# Patient Record
Sex: FEMALE | Race: WHITE | NOT HISPANIC OR LATINO | Employment: FULL TIME | URBAN - METROPOLITAN AREA
[De-identification: names, ages, dates, MRNs, and addresses within clinical notes are randomized per-mention and may not be internally consistent; named-entity substitution may affect disease eponyms.]

---

## 2023-03-06 ENCOUNTER — TELEPHONE (OUTPATIENT)
Dept: PRIMARY CARE | Facility: CLINIC | Age: 48
End: 2023-03-06

## 2023-03-15 ENCOUNTER — OFFICE VISIT (OUTPATIENT)
Dept: PRIMARY CARE | Facility: CLINIC | Age: 48
End: 2023-03-15
Payer: COMMERCIAL

## 2023-03-15 VITALS
TEMPERATURE: 96.9 F | HEIGHT: 66 IN | HEART RATE: 83 BPM | OXYGEN SATURATION: 96 % | WEIGHT: 293 LBS | BODY MASS INDEX: 47.09 KG/M2 | DIASTOLIC BLOOD PRESSURE: 86 MMHG | SYSTOLIC BLOOD PRESSURE: 136 MMHG

## 2023-03-15 DIAGNOSIS — J18.9 PNEUMONIA OF RIGHT LOWER LOBE DUE TO INFECTIOUS ORGANISM: Primary | ICD-10-CM

## 2023-03-15 PROCEDURE — 99213 OFFICE O/P EST LOW 20 MIN: CPT | Performed by: INTERNAL MEDICINE

## 2023-03-15 RX ORDER — ROPINIROLE 2 MG/1
1 TABLET, FILM COATED ORAL NIGHTLY
COMMUNITY
Start: 2020-05-21 | End: 2023-07-27 | Stop reason: SDUPTHER

## 2023-03-15 RX ORDER — IPRATROPIUM BROMIDE AND ALBUTEROL SULFATE 2.5; .5 MG/3ML; MG/3ML
SOLUTION RESPIRATORY (INHALATION)
COMMUNITY
Start: 2023-03-06

## 2023-03-15 RX ORDER — BUDESONIDE AND FORMOTEROL FUMARATE DIHYDRATE 160; 4.5 UG/1; UG/1
2 AEROSOL RESPIRATORY (INHALATION) 2 TIMES DAILY
COMMUNITY
Start: 2023-03-01

## 2023-03-15 RX ORDER — FLUTICASONE PROPIONATE 50 MCG
2 SPRAY, SUSPENSION (ML) NASAL DAILY
COMMUNITY
Start: 2019-02-15 | End: 2023-08-01 | Stop reason: SDUPTHER

## 2023-03-15 RX ORDER — MEDROXYPROGESTERONE ACETATE 10 MG/1
TABLET ORAL
COMMUNITY
Start: 2022-11-17 | End: 2023-12-20 | Stop reason: SDUPTHER

## 2023-03-15 RX ORDER — MOMETASONE FUROATE 1 MG/G
CREAM TOPICAL
COMMUNITY
Start: 2021-05-20

## 2023-03-15 RX ORDER — CLOBETASOL PROPIONATE 0.5 MG/G
OINTMENT TOPICAL
COMMUNITY
Start: 2021-11-17

## 2023-03-15 RX ORDER — MONTELUKAST SODIUM 10 MG/1
1 TABLET ORAL DAILY
COMMUNITY
Start: 2019-05-16 | End: 2023-08-01 | Stop reason: SDUPTHER

## 2023-03-15 RX ORDER — IBUPROFEN 600 MG/1
1 TABLET ORAL 3 TIMES DAILY PRN
COMMUNITY
Start: 2020-02-21

## 2023-03-15 RX ORDER — MAGNESIUM 200 MG
TABLET ORAL
COMMUNITY
Start: 2019-11-20

## 2023-03-15 RX ORDER — ALBUTEROL SULFATE 90 UG/1
AEROSOL, METERED RESPIRATORY (INHALATION)
COMMUNITY
Start: 2023-03-03 | End: 2023-08-01 | Stop reason: SDUPTHER

## 2023-03-15 RX ORDER — FLUOCINOLONE ACETONIDE 0.11 MG/ML
OIL AURICULAR (OTIC)
COMMUNITY
Start: 2021-05-20 | End: 2024-01-31 | Stop reason: ALTCHOICE

## 2023-03-15 RX ORDER — AZELASTINE 1 MG/ML
SPRAY, METERED NASAL 2 TIMES DAILY
COMMUNITY
Start: 2021-06-21

## 2023-03-15 RX ORDER — MULTIVIT WITH MINERALS/HERBS
1 TABLET ORAL DAILY
COMMUNITY
End: 2023-08-01 | Stop reason: ALTCHOICE

## 2023-03-15 RX ORDER — CHOLECALCIFEROL (VITAMIN D3) 1250 MCG
TABLET ORAL
COMMUNITY
Start: 2019-11-20

## 2023-03-15 ASSESSMENT — ENCOUNTER SYMPTOMS
ALLERGIC/IMMUNOLOGIC NEGATIVE: 1
CONSTITUTIONAL NEGATIVE: 1
HEMATOLOGIC/LYMPHATIC NEGATIVE: 1
NEUROLOGICAL NEGATIVE: 1
CARDIOVASCULAR NEGATIVE: 1
PSYCHIATRIC NEGATIVE: 1
GASTROINTESTINAL NEGATIVE: 1
EYES NEGATIVE: 1
MUSCULOSKELETAL NEGATIVE: 1
COUGH: 1
ENDOCRINE NEGATIVE: 1

## 2023-03-15 NOTE — PROGRESS NOTES
"Subjective   Patient ID: Carolina Murrell is a 47 y.o. female who presents for pneumonia follow up .  Patient seen by me on 3/1 with bronchitis.  She was getting worse on therapy and was very short of breath a few days later so was given phone instructions to go to ER.  She states she went to ER on 3/6, but they were so busy, she went to an Urgent Care.  She states a CXR was done and she was told that she had pneumonia due to a \"spot\" in the right lower lung.  She was treated with albuterol and advised to continue Symbicort as well as a 7 day course of levofloxacin.  She states on day 6 of this, she finally started feeling better and is doing well now with no dyspnea, but still mild residual cough.  No fever or chills.  It is noted that she failed the therapy of doxycycline.        Review of Systems   Constitutional: Negative.    HENT: Negative.     Eyes: Negative.    Respiratory:  Positive for cough.    Cardiovascular: Negative.    Gastrointestinal: Negative.    Endocrine: Negative.    Genitourinary: Negative.    Musculoskeletal: Negative.    Skin: Negative.    Allergic/Immunologic: Negative.    Neurological: Negative.    Hematological: Negative.    Psychiatric/Behavioral: Negative.         Objective     Blood pressure 136/86, pulse 83, temperature 36.1 °C (96.9 °F), temperature source Temporal, height 1.676 m (5' 6\"), weight (!) 182 kg (400 lb 12.8 oz), SpO2 96 %.   Physical Exam  Constitutional:       Appearance: Normal appearance.   Neck:      Vascular: No carotid bruit.   Cardiovascular:      Rate and Rhythm: Normal rate and regular rhythm.      Pulses: Normal pulses.      Heart sounds: Normal heart sounds. No murmur heard.  Pulmonary:      Effort: Pulmonary effort is normal.      Breath sounds: Normal breath sounds. No wheezing or rales.   Abdominal:      General: Abdomen is flat. There is no distension.      Palpations: Abdomen is soft.      Tenderness: There is no abdominal tenderness.   Musculoskeletal:    "      General: Normal range of motion.      Cervical back: Normal range of motion and neck supple.   Skin:     General: Skin is warm and dry.   Neurological:      General: No focal deficit present.      Mental Status: She is alert and oriented to person, place, and time.   Psychiatric:         Mood and Affect: Mood normal.         Behavior: Behavior normal.         Assessment/Plan   Problem List Items Addressed This Visit    None  Visit Diagnoses       Pneumonia of right lower lobe due to infectious organism    -  Primary        She has clinically improved nicely.  Exam is negative.  Will recheck CXR 4 weeks after the first one.    Follow up as scheduled

## 2023-07-27 DIAGNOSIS — G25.81 RESTLESS LEG SYNDROME: Primary | ICD-10-CM

## 2023-07-27 RX ORDER — ROPINIROLE 2 MG/1
2 TABLET, FILM COATED ORAL NIGHTLY
Qty: 30 TABLET | Refills: 0 | Status: SHIPPED | OUTPATIENT
Start: 2023-07-27 | End: 2023-08-01 | Stop reason: SDUPTHER

## 2023-08-01 ENCOUNTER — OFFICE VISIT (OUTPATIENT)
Dept: PRIMARY CARE | Facility: CLINIC | Age: 48
End: 2023-08-01
Payer: COMMERCIAL

## 2023-08-01 VITALS
OXYGEN SATURATION: 98 % | DIASTOLIC BLOOD PRESSURE: 80 MMHG | SYSTOLIC BLOOD PRESSURE: 126 MMHG | HEIGHT: 66 IN | HEART RATE: 79 BPM | BODY MASS INDEX: 47.09 KG/M2 | TEMPERATURE: 97 F | WEIGHT: 293 LBS

## 2023-08-01 DIAGNOSIS — J30.1 ALLERGIC RHINITIS DUE TO POLLEN, UNSPECIFIED SEASONALITY: Primary | ICD-10-CM

## 2023-08-01 DIAGNOSIS — E66.01 CLASS 3 SEVERE OBESITY DUE TO EXCESS CALORIES WITH SERIOUS COMORBIDITY AND BODY MASS INDEX (BMI) OF 60.0 TO 69.9 IN ADULT (MULTI): ICD-10-CM

## 2023-08-01 DIAGNOSIS — G25.81 RESTLESS LEG SYNDROME: ICD-10-CM

## 2023-08-01 DIAGNOSIS — E78.1 HYPERTRIGLYCERIDEMIA: ICD-10-CM

## 2023-08-01 PROBLEM — I10 ESSENTIAL HYPERTENSION: Status: ACTIVE | Noted: 2023-08-01

## 2023-08-01 PROBLEM — J30.9 ALLERGIC RHINITIS: Status: ACTIVE | Noted: 2023-08-01

## 2023-08-01 PROBLEM — E78.00 HYPERCHOLESTEROLEMIA: Status: ACTIVE | Noted: 2023-08-01

## 2023-08-01 PROCEDURE — 1036F TOBACCO NON-USER: CPT | Performed by: INTERNAL MEDICINE

## 2023-08-01 PROCEDURE — 99214 OFFICE O/P EST MOD 30 MIN: CPT | Performed by: INTERNAL MEDICINE

## 2023-08-01 PROCEDURE — 3079F DIAST BP 80-89 MM HG: CPT | Performed by: INTERNAL MEDICINE

## 2023-08-01 PROCEDURE — 3008F BODY MASS INDEX DOCD: CPT | Performed by: INTERNAL MEDICINE

## 2023-08-01 PROCEDURE — 3074F SYST BP LT 130 MM HG: CPT | Performed by: INTERNAL MEDICINE

## 2023-08-01 RX ORDER — GABAPENTIN 100 MG/1
100 CAPSULE ORAL NIGHTLY
Qty: 90 CAPSULE | Refills: 1 | Status: SHIPPED | OUTPATIENT
Start: 2023-08-01 | End: 2024-01-31 | Stop reason: SDUPTHER

## 2023-08-01 RX ORDER — ROPINIROLE 2 MG/1
2 TABLET, FILM COATED ORAL NIGHTLY
Qty: 90 TABLET | Refills: 1 | Status: SHIPPED | OUTPATIENT
Start: 2023-08-01 | End: 2023-08-01 | Stop reason: SDUPTHER

## 2023-08-01 RX ORDER — MONTELUKAST SODIUM 10 MG/1
10 TABLET ORAL DAILY
Qty: 90 TABLET | Refills: 1 | Status: SHIPPED | OUTPATIENT
Start: 2023-08-01 | End: 2023-08-01 | Stop reason: SDUPTHER

## 2023-08-01 RX ORDER — ALBUTEROL SULFATE 90 UG/1
AEROSOL, METERED RESPIRATORY (INHALATION)
Qty: 54 G | Refills: 1 | Status: SHIPPED | OUTPATIENT
Start: 2023-08-01 | End: 2023-08-01 | Stop reason: SDUPTHER

## 2023-08-01 RX ORDER — FLUTICASONE PROPIONATE 50 MCG
2 SPRAY, SUSPENSION (ML) NASAL DAILY
Qty: 48 G | Refills: 1 | Status: SHIPPED | OUTPATIENT
Start: 2023-08-01 | End: 2023-08-01 | Stop reason: SDUPTHER

## 2023-08-01 ASSESSMENT — ENCOUNTER SYMPTOMS
ALLERGIC/IMMUNOLOGIC NEGATIVE: 1
CARDIOVASCULAR NEGATIVE: 1
MUSCULOSKELETAL NEGATIVE: 1
HEMATOLOGIC/LYMPHATIC NEGATIVE: 1
EYES NEGATIVE: 1
CONSTITUTIONAL NEGATIVE: 1
RESPIRATORY NEGATIVE: 1
NEUROLOGICAL NEGATIVE: 1
GASTROINTESTINAL NEGATIVE: 1
PSYCHIATRIC NEGATIVE: 1
ENDOCRINE NEGATIVE: 1

## 2023-08-01 NOTE — PROGRESS NOTES
"              After Visit Summary   2017    Richard Ball    MRN: [id number]           Patient Information     Date Of Birth          1963        Visit Information        Provider Department      2017 1:20 PM Teresa Esquivel MD [facility]        Today's Diagnoses     Cough    -  1     Acute non-recurrent frontal sinusitis            Follow-ups after your visit        Who to contact     If you have questions or need follow up information about today's clinic visit or your schedule please contact [facility] directly at [phone].  Normal or non-critical lab and imaging results will be communicated to you by [facility], letter or phone within 4 business days after the clinic has received the results. If you do not hear from us within 7 days, please contact the clinic through [facility] or phone. If you have a critical or abnormal lab result, we will notify you by phone as soon as possible.  Submit refill requests through shenzhoufu or call your pharmacy and they will forward the refill request to us. Please allow 3 business days for your refill to be completed.          Additional Information About Your Visit        MyChart Information     [email or web address] lets you send messages to your doctor, view your test results, renew your prescriptions, schedule appointments and more. To sign up, go to [email or web address] . Click on \"Log in\" on the left side of the screen, which will take you to the Welcome page. Then click on \"Sign up Now\" on the right side of the page.     You will be asked to enter the access code listed below, as well as some personal information. Please follow the directions to create your username and password.     Your access code is: [email or web address]  Expires: 3/8/2017  5:25 PM     Your access code will  in 90 days. If you need help or a new code, please call your [facility] or [phone].        Care EveryWhere ID     This is your Care EveryWhere ID. This " "Subjective   Patient ID: Carolina Murrell is a 47 y.o. female who presents for 6 month follow up .  Patient presents today in follow up re:   allergic rhinitis and restless legs.    Rhinitis Sx have been overall stable on current medical therapy.  No adverse effects of therapy.    Restless legs have not been as well controlled with therapy of late.  She has tried taking a second ropinirole with modestly good effect.  No adverse effects of therapy reported.        Review of Systems   Constitutional: Negative.    HENT: Negative.     Eyes: Negative.    Respiratory: Negative.     Cardiovascular: Negative.    Gastrointestinal: Negative.    Endocrine: Negative.    Genitourinary: Negative.    Musculoskeletal: Negative.    Skin: Negative.    Allergic/Immunologic: Negative.    Neurological: Negative.    Hematological: Negative.    Psychiatric/Behavioral: Negative.         Objective     Blood pressure 126/80, pulse 79, temperature 36.1 °C (97 °F), temperature source Temporal, height 1.676 m (5' 6\"), weight (!) 187 kg (411 lb 12.8 oz), SpO2 98 %.   Physical Exam  Vitals reviewed.   Constitutional:       Appearance: Normal appearance. She is obese.   Neck:      Vascular: No carotid bruit.   Cardiovascular:      Rate and Rhythm: Normal rate and regular rhythm.      Pulses: Normal pulses.      Heart sounds: Normal heart sounds. No murmur heard.  Pulmonary:      Effort: Pulmonary effort is normal.      Breath sounds: Normal breath sounds. No wheezing or rales.   Abdominal:      General: Abdomen is flat. There is no distension.      Palpations: Abdomen is soft.      Tenderness: There is no abdominal tenderness.   Musculoskeletal:         General: Normal range of motion.      Cervical back: Normal range of motion and neck supple.   Skin:     General: Skin is warm and dry.   Neurological:      General: No focal deficit present.      Mental Status: She is alert and oriented to person, place, and time.   Psychiatric:         Mood and " "could be used by other organizations to access your [place] medical records  [id number]        Your Vitals Were     Pulse Temperature Respirations Height BMI (Body Mass Index) Pulse Oximetry    70 97.6  F (36.4  C) (Tympanic) 20 5' 11\" (1.803 m) 23.86 kg/m2 97%       Blood Pressure from Last 3 Encounters:   01/04/17 122/60   12/08/16 116/52   04/15/15 120/60    Weight from Last 3 Encounters:   01/04/17 171 lb (77.565 kg)   12/08/16 170 lb 11.2 oz (77.429 kg)   04/15/15 171 lb (77.565 kg)              We Performed the Following     Bordetella pert parapert DNA pcr          Today's Medication Changes          These changes are accurate as of: 1/4/17  1:42 PM.  If you have any questions, ask your nurse or doctor.               Start taking these medicines.        Dose/Directions    cefdinir 300 MG capsule   Commonly known as:  OMNICEF   Used for:  Acute non-recurrent frontal sinusitis   Started by:  Teresa Esquivel MD        Dose:  300 mg   Take 1 capsule (300 mg) by mouth 2 times daily   Quantity:  20 capsule   Refills:  0       predniSONE 20 MG tablet   Commonly known as:  DELTASONE   Used for:  Acute non-recurrent frontal sinusitis   Started by:  Teresa Esquivel MD        Dose:  40 mg   Take 2 tablets (40 mg) by mouth daily for 5 days   Quantity:  10 tablet   Refills:  0            Where to get your medicines      These medications were sent to [facility] [address] [place] [address] [id number] [place] [address]  [address] [facility] [address]     Phone:  [phone]    - cefdinir 300 MG capsule  - predniSONE 20 MG tablet             Primary Care Provider Office Phone # Fax #    Royer Roblero [phone]       [facility] [address] [place] [address]        Thank you!     Thank you for choosing [facility]  for your care. Our goal is always to provide you with excellent care. Hearing back from our patients is one way we can continue to " Affect: Mood normal.         Behavior: Behavior normal.         Assessment/Plan   Problem List Items Addressed This Visit       Allergic rhinitis - Primary    Relevant Medications    albuterol 90 mcg/actuation inhaler    fluticasone (Flonase) 50 mcg/actuation nasal spray    montelukast (Singulair) 10 mg tablet     Other Visit Diagnoses       Restless leg syndrome        Relevant Medications    rOPINIRole (Requip) 2 mg tablet    gabapentin (Neurontin) 100 mg capsule    Hypertriglyceridemia        Relevant Orders    Comprehensive Metabolic Panel    Lipid Panel    TSH with reflex to Free T4 if abnormal    Class 3 severe obesity due to excess calories with serious comorbidity and body mass index (BMI) of 60.0 to 69.9 in adult (CMS/[facility])              Rhinitis Sx well compensated on current therapy.  Will continue present therapy and follow.  Restless legs Sx not as well compensated on current therapy.  Will continue present therapy, but add gabapentin and follow.  Will be rechecking labs prior to next OV.  Pt. advised on improving dietary choices and portion control as well as increasing exercise to promote weight loss.  [facility] and she states that she made multiple phone calls without getting her call answered and she got no return call after leaving messages.  We will try to help get her access.    Follow up in 6 months  Lab to be drawn 1 week prior to next office visit.       improve our services. Please take a few minutes to complete the written survey that you may receive in the mail after your visit with us. Thank you!             Your Updated Medication List - Protect others around you: Learn how to safely use, store and throw away your medicines at www.disposemymeds.org.          This list is accurate as of: 1/4/17  1:42 PM.  Always use your most recent med list.                   Brand Name Dispense Instructions for use    atenolol 50 MG tablet    TENORMIN     Take 50 mg by mouth daily       atorvastatin 20 MG tablet    LIPITOR         cefdinir 300 MG capsule    OMNICEF    20 capsule    Take 1 capsule (300 mg) by mouth 2 times daily       COUMADIN 5 MG tablet   Generic drug:  warfarin      Take 0.5 tablets (2.5 mg) by mouth daily While on antibiotic       fexofenadine 180 MG tablet    ALLEGRA    30 tablet    Take 1 tablet (180 mg) by mouth daily       predniSONE 20 MG tablet    DELTASONE    10 tablet    Take 2 tablets (40 mg) by mouth daily for 5 days       VENTOLIN  (90 BASE) MCG/ACT Inhaler   Generic drug:  albuterol

## 2023-08-07 RX ORDER — ALBUTEROL SULFATE 90 UG/1
AEROSOL, METERED RESPIRATORY (INHALATION)
Qty: 54 G | Refills: 1 | Status: SHIPPED | OUTPATIENT
Start: 2023-08-07 | End: 2024-01-31 | Stop reason: SDUPTHER

## 2023-08-07 RX ORDER — ROPINIROLE 2 MG/1
2 TABLET, FILM COATED ORAL NIGHTLY
Qty: 90 TABLET | Refills: 1 | Status: SHIPPED | OUTPATIENT
Start: 2023-08-07 | End: 2024-01-31 | Stop reason: SDUPTHER

## 2023-08-07 RX ORDER — MONTELUKAST SODIUM 10 MG/1
10 TABLET ORAL DAILY
Qty: 90 TABLET | Refills: 1 | Status: SHIPPED | OUTPATIENT
Start: 2023-08-07 | End: 2024-01-31 | Stop reason: SDUPTHER

## 2023-08-07 RX ORDER — FLUTICASONE PROPIONATE 50 MCG
2 SPRAY, SUSPENSION (ML) NASAL DAILY
Qty: 144 G | Refills: 1 | Status: SHIPPED | OUTPATIENT
Start: 2023-08-07 | End: 2024-01-31 | Stop reason: SDUPTHER

## 2023-09-27 ENCOUNTER — TELEPHONE (OUTPATIENT)
Dept: PRIMARY CARE | Facility: CLINIC | Age: 48
End: 2023-09-27
Payer: COMMERCIAL

## 2023-09-27 NOTE — TELEPHONE ENCOUNTER
Patient called in and stated that [facility] an an [facility]TI. The patient was given an antibiotic but it is not working for the UTI. Would you like for me to schedule a nurse visit for this patient?

## 2023-10-04 ENCOUNTER — OFFICE VISIT (OUTPATIENT)
Dept: PRIMARY CARE | Facility: CLINIC | Age: 48
End: 2023-10-04
Payer: COMMERCIAL

## 2023-10-04 VITALS
TEMPERATURE: 97.4 F | HEART RATE: 78 BPM | WEIGHT: 293 LBS | DIASTOLIC BLOOD PRESSURE: 60 MMHG | BODY MASS INDEX: 66.34 KG/M2 | OXYGEN SATURATION: 98 % | SYSTOLIC BLOOD PRESSURE: 110 MMHG

## 2023-10-04 DIAGNOSIS — N30.01 ACUTE CYSTITIS WITH HEMATURIA: Primary | ICD-10-CM

## 2023-10-04 PROCEDURE — 3078F DIAST BP <80 MM HG: CPT | Performed by: INTERNAL MEDICINE

## 2023-10-04 PROCEDURE — 1036F TOBACCO NON-USER: CPT | Performed by: INTERNAL MEDICINE

## 2023-10-04 PROCEDURE — 3008F BODY MASS INDEX DOCD: CPT | Performed by: INTERNAL MEDICINE

## 2023-10-04 PROCEDURE — 99213 OFFICE O/P EST LOW 20 MIN: CPT | Performed by: INTERNAL MEDICINE

## 2023-10-04 PROCEDURE — 3074F SYST BP LT 130 MM HG: CPT | Performed by: INTERNAL MEDICINE

## 2023-10-04 RX ORDER — CIPROFLOXACIN 250 MG/1
250 TABLET, FILM COATED ORAL 2 TIMES DAILY
Qty: 10 TABLET | Refills: 0 | Status: SHIPPED | OUTPATIENT
Start: 2023-10-04 | End: 2023-10-09

## 2023-10-04 ASSESSMENT — ENCOUNTER SYMPTOMS
CARDIOVASCULAR NEGATIVE: 1
ENDOCRINE NEGATIVE: 1
MUSCULOSKELETAL NEGATIVE: 1
HEMATOLOGIC/LYMPHATIC NEGATIVE: 1
GASTROINTESTINAL NEGATIVE: 1
NEUROLOGICAL NEGATIVE: 1
PSYCHIATRIC NEGATIVE: 1
RESPIRATORY NEGATIVE: 1
ALLERGIC/IMMUNOLOGIC NEGATIVE: 1
CONSTITUTIONAL NEGATIVE: 1
EYES NEGATIVE: 1
FREQUENCY: 1

## 2023-10-04 NOTE — PROGRESS NOTES
Subjective   Patient ID: Carolina Murrell is a 48 y.o. female who presents for Follow-up.  Patient presents today in follow up re:   [facility] visit of 10/1/23.  She was Dx with UTI due to Sx and positive UA.  She was treated with Macrodantin, but per chart review, I see that she was called twice today by [facility] to inform her that the organism may be resistant to the Macrodantin, however, I am not able to see the actual urine culture report.  She states that she was told that the organism was Proteus mirabilis on the phone today.  She states that the frequency has improved, but the urgency is persistent.  No dysuria or other associated Sx.        Review of Systems   Constitutional: Negative.    HENT: Negative.     Eyes: Negative.    Respiratory: Negative.     Cardiovascular: Negative.    Gastrointestinal: Negative.    Endocrine: Negative.    Genitourinary:  Positive for frequency and urgency.   Musculoskeletal: Negative.    Skin: Negative.    Allergic/Immunologic: Negative.    Neurological: Negative.    Hematological: Negative.    Psychiatric/Behavioral: Negative.         Objective     Blood pressure 110/60, pulse 78, temperature 36.3 °C (97.4 °F), temperature source Temporal, weight (!) 186 kg (411 lb), SpO2 98 %.   Physical Exam  Vitals reviewed.   Constitutional:       Appearance: Normal appearance. She is obese.   HENT:      Nose: Nose normal.      Mouth/Throat:      Mouth: Mucous membranes are moist.      Pharynx: Oropharynx is clear.   Neck:      Vascular: No carotid bruit.   Cardiovascular:      Rate and Rhythm: Normal rate and regular rhythm.      Pulses: Normal pulses.      Heart sounds: Normal heart sounds. No murmur heard.  Pulmonary:      Effort: Pulmonary effort is normal.      Breath sounds: Normal breath sounds. No wheezing or rales.   Abdominal:      General: Abdomen is flat. There is no distension.      Palpations: Abdomen is soft.      Tenderness: There is no abdominal tenderness.    Musculoskeletal:         General: Normal range of motion.      Cervical back: Normal range of motion and neck supple.   Skin:     General: Skin is warm and dry.   Neurological:      General: No focal deficit present.      Mental Status: She is alert and oriented to person, place, and time.   Psychiatric:         Mood and Affect: Mood normal.         Behavior: Behavior normal.         Assessment/Plan   Problem List Items Addressed This Visit    None  Visit Diagnoses       Acute cystitis with hematuria    -  Primary    Relevant Medications    ciprofloxacin (Cipro) 250 mg tablet          Since she has been informed of the organism (Proteus mirabilis) and her Sx have not resolved, will treat with Cipro for 5 day course.    Follow up as scheduled

## 2023-12-20 ENCOUNTER — TELEPHONE (OUTPATIENT)
Dept: OBSTETRICS AND GYNECOLOGY | Facility: CLINIC | Age: 48
End: 2023-12-20
Payer: COMMERCIAL

## 2023-12-20 DIAGNOSIS — N93.9 ABNORMAL UTERINE BLEEDING: Primary | ICD-10-CM

## 2023-12-20 RX ORDER — MEDROXYPROGESTERONE ACETATE 10 MG/1
TABLET ORAL
Qty: 10 TABLET | Refills: 3 | Status: SHIPPED | OUTPATIENT
Start: 2023-12-20 | End: 2023-12-29 | Stop reason: SDUPTHER

## 2023-12-20 NOTE — TELEPHONE ENCOUNTER
Please advise,   Patient is scheduled with you for an Annual on 1/10/24.   Patient was seen for endo bx with Dr. Monreal 11/17/2022. Annual with Dr. Monreal on 11/24/2021 and 06/24/2020

## 2023-12-20 NOTE — TELEPHONE ENCOUNTER
Patient would like to know if there is any way Dalila can call her in Provera again because she has started bleeding again like she did before. Patient has an appointment 1/10 to come see Dalila and she's hoping to have the medication started so she can let Dalila know how its going. Please advise.     Pharmacy- [facility] in [place].

## 2023-12-28 ENCOUNTER — TELEPHONE (OUTPATIENT)
Dept: OBSTETRICS AND GYNECOLOGY | Facility: CLINIC | Age: 48
End: 2023-12-28
Payer: COMMERCIAL

## 2023-12-28 DIAGNOSIS — N93.9 ABNORMAL UTERINE BLEEDING: ICD-10-CM

## 2023-12-28 NOTE — TELEPHONE ENCOUNTER
Provera was prescribed for patient on 12/20 for heavy bleeding. Spoke with patient today, states the bleeding has decreased significantly but patient wanted to make sure this is normal. I did informed patient it can take up to 10-14 days from my knowledge but would send the task to Dalila. Please advise.   She does have an upcoming appointment with you on 1/10/24

## 2023-12-29 ENCOUNTER — LAB (OUTPATIENT)
Dept: LAB | Facility: LAB | Age: 48
End: 2023-12-29
Payer: COMMERCIAL

## 2023-12-29 DIAGNOSIS — E78.1 HYPERTRIGLYCERIDEMIA: ICD-10-CM

## 2023-12-29 DIAGNOSIS — E78.1 HYPERTRIGLYCERIDEMIA: Primary | ICD-10-CM

## 2023-12-29 RX ORDER — MEDROXYPROGESTERONE ACETATE 10 MG/1
TABLET ORAL
Qty: 10 TABLET | Refills: 3 | Status: SHIPPED | OUTPATIENT
Start: 2023-12-29 | End: 2024-01-10 | Stop reason: SDUPTHER

## 2023-12-29 NOTE — TELEPHONE ENCOUNTER
Yes this is normal.  It is showing that it is working by reducing the bleeding a lot, but sometimes provera may not actually stop the bleeding.  We will assess further for other causes of concern on her exam at her appointment. The provera is working to prevent her from bleeding heavily.  We can continue provera if the bleeding picks up.

## 2023-12-29 NOTE — TELEPHONE ENCOUNTER
Spoke to the patient. She is having cramping and an increase in vaginal bleeding this morning. She has two Provera pills left.

## 2024-01-10 ENCOUNTER — APPOINTMENT (OUTPATIENT)
Dept: LAB | Facility: LAB | Age: 49
End: 2024-01-10
Payer: COMMERCIAL

## 2024-01-10 ENCOUNTER — OFFICE VISIT (OUTPATIENT)
Dept: OBSTETRICS AND GYNECOLOGY | Facility: CLINIC | Age: 49
End: 2024-01-10
Payer: COMMERCIAL

## 2024-01-10 VITALS
WEIGHT: 293 LBS | BODY MASS INDEX: 47.09 KG/M2 | HEIGHT: 66 IN | DIASTOLIC BLOOD PRESSURE: 68 MMHG | SYSTOLIC BLOOD PRESSURE: 134 MMHG

## 2024-01-10 DIAGNOSIS — Z11.51 SCREENING FOR HUMAN PAPILLOMAVIRUS (HPV): ICD-10-CM

## 2024-01-10 DIAGNOSIS — Z01.411 ENCNTR FOR GYN EXAM (GENERAL) (ROUTINE) W ABNORMAL FINDINGS: ICD-10-CM

## 2024-01-10 DIAGNOSIS — Z12.4 PAP SMEAR FOR CERVICAL CANCER SCREENING: ICD-10-CM

## 2024-01-10 DIAGNOSIS — N93.8 DUB (DYSFUNCTIONAL UTERINE BLEEDING): Primary | ICD-10-CM

## 2024-01-10 DIAGNOSIS — N93.9 ABNORMAL UTERINE BLEEDING: ICD-10-CM

## 2024-01-10 DIAGNOSIS — Z12.31 SCREENING MAMMOGRAM FOR BREAST CANCER: ICD-10-CM

## 2024-01-10 LAB
ALBUMIN SERPL BCP-MCNC: 3.9 G/DL (ref 3.4–5)
ALP SERPL-CCNC: 65 U/L (ref 33–110)
ALT SERPL W P-5'-P-CCNC: 14 U/L (ref 7–45)
ANION GAP SERPL CALC-SCNC: 10 MMOL/L (ref 10–20)
AST SERPL W P-5'-P-CCNC: 15 U/L (ref 9–39)
BILIRUB SERPL-MCNC: 0.6 MG/DL (ref 0–1.2)
BUN SERPL-MCNC: 13 MG/DL (ref 6–23)
CALCIUM SERPL-MCNC: 9 MG/DL (ref 8.6–10.3)
CHLORIDE SERPL-SCNC: 104 MMOL/L (ref 98–107)
CHOLEST SERPL-MCNC: 194 MG/DL (ref 0–199)
CHOLESTEROL/HDL RATIO: 4.9
CO2 SERPL-SCNC: 27 MMOL/L (ref 21–32)
CREAT SERPL-MCNC: 0.7 MG/DL (ref 0.5–1.05)
EGFRCR SERPLBLD CKD-EPI 2021: >90 ML/MIN/1.73M*2
GLUCOSE SERPL-MCNC: 85 MG/DL (ref 74–99)
HDLC SERPL-MCNC: 39.9 MG/DL
LDLC SERPL CALC-MCNC: 131 MG/DL
NON HDL CHOLESTEROL: 154 MG/DL (ref 0–149)
POTASSIUM SERPL-SCNC: 4 MMOL/L (ref 3.5–5.3)
PROT SERPL-MCNC: 6.6 G/DL (ref 6.4–8.2)
SODIUM SERPL-SCNC: 137 MMOL/L (ref 136–145)
TRIGL SERPL-MCNC: 118 MG/DL (ref 0–149)
TSH SERPL-ACNC: 1.87 MIU/L (ref 0.44–3.98)
VLDL: 24 MG/DL (ref 0–40)

## 2024-01-10 PROCEDURE — 99213 OFFICE O/P EST LOW 20 MIN: CPT | Performed by: NURSE PRACTITIONER

## 2024-01-10 PROCEDURE — 84443 ASSAY THYROID STIM HORMONE: CPT

## 2024-01-10 PROCEDURE — 99396 PREV VISIT EST AGE 40-64: CPT | Performed by: NURSE PRACTITIONER

## 2024-01-10 PROCEDURE — 80061 LIPID PANEL: CPT

## 2024-01-10 PROCEDURE — 3008F BODY MASS INDEX DOCD: CPT | Performed by: NURSE PRACTITIONER

## 2024-01-10 PROCEDURE — 36415 COLL VENOUS BLD VENIPUNCTURE: CPT

## 2024-01-10 PROCEDURE — 80053 COMPREHEN METABOLIC PANEL: CPT

## 2024-01-10 PROCEDURE — 87624 HPV HI-RISK TYP POOLED RSLT: CPT

## 2024-01-10 PROCEDURE — 3078F DIAST BP <80 MM HG: CPT | Performed by: NURSE PRACTITIONER

## 2024-01-10 PROCEDURE — 3075F SYST BP GE 130 - 139MM HG: CPT | Performed by: NURSE PRACTITIONER

## 2024-01-10 PROCEDURE — 1036F TOBACCO NON-USER: CPT | Performed by: NURSE PRACTITIONER

## 2024-01-10 RX ORDER — MEDROXYPROGESTERONE ACETATE 10 MG/1
TABLET ORAL
Qty: 10 TABLET | Refills: 3 | Status: SHIPPED | OUTPATIENT
Start: 2024-01-10 | End: 2024-01-17 | Stop reason: ALTCHOICE

## 2024-01-10 ASSESSMENT — ENCOUNTER SYMPTOMS
NEUROLOGICAL NEGATIVE: 1
PSYCHIATRIC NEGATIVE: 1
CONSTITUTIONAL NEGATIVE: 1
RESPIRATORY NEGATIVE: 1
ENDOCRINE NEGATIVE: 1
GASTROINTESTINAL NEGATIVE: 1
EYES NEGATIVE: 1
MUSCULOSKELETAL NEGATIVE: 1
CARDIOVASCULAR NEGATIVE: 1

## 2024-01-10 NOTE — PROGRESS NOTES
Subjective   Patient ID: Carolina Murrell is a 48 y.o. female who presents for Annual Exam (Patient states she started her period 4 weeks ago and is still having heavy bleeding. /Took her last Provera pill this morning./Reviewing EMR indicates normal PAP /HPV 11/24/2021. Mammogram completed 2/1/2023).  48 year old here for annual exam with complaints of having ongoing heavy bleeding.  She is due for her pap as her last pap was normal with neg hpv in 2021.  She is due for her mammogram in feb 2024.  She has complaints of having ongoing heavy menses since her period started on 12/14/23.  She notes that she has had heavy bleeding every day.  She was soaking a pad every 30 minutes to an hour and passing clots for about 10 days.  She then called the office and was prescribed provera.  She began taking provera daily which helped to decrease her bleeding.  She is now still bleeding daily, but it is much less.  She is now bleeding only when she stands up or changes position and passing clots about the size of a dime. If she is late on a dose of provera or runs out she will start bleeding heavily again.  She had blood work drawn this am from her pcp to monitor her thyroid.  She notes her mom and sister have a history of heavy bleeding, endometriosis and had to have hysterectomies in their late 40s.          Review of Systems   Constitutional: Negative.    HENT: Negative.     Eyes: Negative.    Respiratory: Negative.     Cardiovascular: Negative.    Gastrointestinal: Negative.    Endocrine: Negative.    Genitourinary:  Positive for menstrual problem.   Musculoskeletal: Negative.    Skin: Negative.    Neurological: Negative.    Psychiatric/Behavioral: Negative.         Objective   Physical Exam  Vitals reviewed.   Constitutional:       Appearance: Normal appearance. She is well-developed.   Pulmonary:      Effort: Pulmonary effort is normal. No respiratory distress.   Chest:   Breasts:     Breasts are symmetrical.      Right:  Normal. No swelling, bleeding, inverted nipple, mass, nipple discharge, skin change or tenderness.      Left: Normal. No swelling, bleeding, inverted nipple, mass, nipple discharge, skin change or tenderness.   Abdominal:      Palpations: Abdomen is soft.   Genitourinary:     General: Normal vulva.      Exam position: Lithotomy position.      Pubic Area: No rash.       Labia:         Right: No rash, tenderness, lesion or injury.         Left: No rash, tenderness, lesion or injury.       Urethra: No prolapse, urethral pain, urethral swelling or urethral lesion.      Vagina: Normal.      Cervix: Cervical bleeding present.      Uterus: Normal.       Adnexa: Right adnexa normal and left adnexa normal.      Rectum: Normal.   Musculoskeletal:         General: Normal range of motion.   Lymphadenopathy:      Upper Body:      Right upper body: No supraclavicular, axillary or pectoral adenopathy.      Left upper body: No supraclavicular, axillary or pectoral adenopathy.   Skin:     General: Skin is warm and dry.   Neurological:      General: No focal deficit present.      Mental Status: She is alert and oriented to person, place, and time. Mental status is at baseline.   Psychiatric:         Attention and Perception: Attention and perception normal.         Mood and Affect: Mood and affect normal.         Speech: Speech normal.         Behavior: Behavior normal. Behavior is cooperative.         Thought Content: Thought content normal.         Judgment: Judgment normal.         Assessment/Plan   Problem List Items Addressed This Visit             ICD-10-CM    Encntr for gyn exam (general) (routine) w abnormal findings Z01.411    DUB (dysfunctional uterine bleeding) - Primary N93.8    Relevant Orders    US PELVIS TRANSABDOMINAL WITH TRANSVAGINAL     Other Visit Diagnoses         Codes    Abnormal uterine bleeding     N93.9    Relevant Medications    medroxyPROGESTERone (Provera) 10 mg tablet    Screening mammogram for breast  cancer     Z12.31    Relevant Orders    BI mammo bilateral screening tomosynthesis        ANNUAL:  Pap/hpv sent  Mammogram ordered     DUB:  Ultrasound ordered  Provera refilled  Reviewed options of bleeding control long term, patient declined IUD and desires surgical intervention.  Will schedule consult pending ultrasound results  Follow up 1 year for annual exam         DARELL Gamboa-CNP 01/10/24 9:22 AM

## 2024-01-12 ENCOUNTER — TELEPHONE (OUTPATIENT)
Dept: OBSTETRICS AND GYNECOLOGY | Facility: CLINIC | Age: 49
End: 2024-01-12
Payer: COMMERCIAL

## 2024-01-12 ENCOUNTER — ANCILLARY PROCEDURE (OUTPATIENT)
Dept: RADIOLOGY | Facility: CLINIC | Age: 49
End: 2024-01-12
Payer: COMMERCIAL

## 2024-01-12 DIAGNOSIS — N93.8 DUB (DYSFUNCTIONAL UTERINE BLEEDING): Primary | ICD-10-CM

## 2024-01-12 DIAGNOSIS — N93.8 DUB (DYSFUNCTIONAL UTERINE BLEEDING): ICD-10-CM

## 2024-01-12 PROCEDURE — 76830 TRANSVAGINAL US NON-OB: CPT

## 2024-01-12 PROCEDURE — 76856 US EXAM PELVIC COMPLETE: CPT | Performed by: RADIOLOGY

## 2024-01-12 PROCEDURE — 76830 TRANSVAGINAL US NON-OB: CPT | Performed by: RADIOLOGY

## 2024-01-12 NOTE — TELEPHONE ENCOUNTER
The patient was in to see Dalila Burns on 1/10/24.   She came in today for her scheduled ultrasound.   Patient was visibly upset. She stated she was bleeding so heavily that she wanted to know if she should go to the urgent care/ER. She passed 3 large blood clots this morning. She stated that in any way she moved she could feel the clots passing.   She was also concerned about having the ultrasound. She was reassured she could still have the test. Which she did. After the exam she was much calmer.   She asked if a message could be sent in regards to this matter.   She was advised that Dalila was out of the office until Monday.

## 2024-01-15 RX ORDER — NORETHINDRONE 5 MG/1
5 TABLET ORAL EVERY 4 HOURS PRN
Qty: 60 TABLET | Refills: 0 | Status: SHIPPED | OUTPATIENT
Start: 2024-01-15 | End: 2024-01-31 | Stop reason: SDUPTHER

## 2024-01-15 NOTE — TELEPHONE ENCOUNTER
Chief Complaint  Chief Complaint   Patient presents with    Annual Exam       Subjective    History of Present Illness        Kaylee Little presents to [facility] for   Kaylee Little is a 57 y.o. female here for her annual physical with me. Kaylee is here for coordination of medical care, to discuss health maintenance, disease prevention as well as to followup on medical problems.     Annual Physical Exam  Patient's last Physical Exam was 11/09/2022 by Dr Carly Worley. Patient's medical history, medications and allergy lists were reviewed and updated.  Activity level is moderate.   Exercises 0 per week.   Appetite is good.   Feels fairly well with none complaints.   Energy level is good.   Sleeps fairly well.   Patient's last colonoscopy was 9/10/2014. She is advised to repeat in 10 years.   Patient's last mammogram was 03/15/2023.   Patient is doing routine self skin exam occasionally.   Patient is doing routine self-breast exams monthly  Last pap smear was done:  11/09/2022 with Dr Carly Worley    - will see oncologist for follow up on 1/22/24    Family History   Problem Relation Age of Onset    Rheum arthritis Mother     Diabetes Mother     Hypertension Mother     Cancer Father         Prostate    COPD Father     Arthritis Sister     Lymphoma Sister     Diabetes Sister     Miscarriages / Stillbirths Sister     Diabetes Brother     Lung cancer Other         MEN ON DAD'S SIDE    Other Other         BRAIN TUMOR;MEN ON DAD'S SIDE    Malig Hyperthermia Neg Hx        Social History     Tobacco Use    Smoking status: Never     Passive exposure: Past    Smokeless tobacco: Never   Vaping Use    Vaping Use: Never used   Substance Use Topics    Alcohol use: Yes     Alcohol/week: 2.0 standard drinks of alcohol     Types: 1 Glasses of wine, 1 Cans of beer per week     Comment: 1-2 drinks a week    Drug use: Never       Past Surgical History:   Procedure Laterality Date     Had ultrasound 1/12/24. States she is still bleeding and passing clots.   BREAST BIOPSY Left 4/5/2022    BREAST RECONSTRUCTION Left 06/06/2022    Procedure: Left breast immediate reconstruction with implant and mesh, possible expander, right breast  Reduction and use of spy;  Surgeon: Marjorie Garcia MD;  Location: [facility];  Service: Plastics;  Laterality: Left;    BREAST RECONSTRUCTION Bilateral 06/28/2023    Procedure: BREAST RECONSTRUCTION REVISION, Bilateral breast scar revision.;  Surgeon: Marjorie Garcia MD;  Location: [facility] OR;  Service: Plastics;  Laterality: Bilateral;    COLONOSCOPY  2015    COSMETIC SURGERY  Implant left breast/rt breat reduction    6/6/22    D & C HYSTEROSCOPY  2018    ENDOMETRIAL BIOPSY  2018    FAT GRAFTING Right 06/28/2023    Procedure: with right breast fat graft;  Surgeon: Marjorie Garcia MD;  Location: [facility] OR;  Service: Plastics;  Laterality: Right;    MASTECTOMY W/ SENTINEL NODE BIOPSY Left 06/06/2022    Procedure: LEFT BREAST SKIN SPARING MASTECTOMY WITH LEFT AXILLARY SENTINEL NODE BIOPSY;  Surgeon: Eloise Mendoza MD;  Location: [facility];  Service: General;  Laterality: Left;    REDUCTION MAMMAPLASTY  6/6/22    Right side to match reconstruction on the left       Patient Active Problem List   Diagnosis    Other seborrheic keratosis    Lesion of lip    Arthralgia    Rosacea    Breast reconstruction deformity    Dry eyes    History of ductal carcinoma in situ (DCIS) of breast    Age-related osteoporosis without current pathological fracture         Current Outpatient Medications:     alendronate (FOSAMAX) 70 MG tablet, Take 1 tablet by mouth Every 7 (Seven) Days., Disp: 12 tablet, Rfl: 0    calcium carb-cholecalciferol 600-800 MG-UNIT tablet, Take 1 tablet by mouth Daily., Disp: , Rfl:     Cholecalciferol (D3-1000) 25 MCG (1000 UT) capsule, Take 1 capsule by mouth Daily., Disp: , Rfl:     loratadine (CLARITIN) 10 MG tablet, Take 1 tablet by mouth Daily., Disp: , Rfl:     multivitamin with minerals tablet  "tablet, Take 1 tablet by mouth Daily., Disp: , Rfl:     naproxen sodium (ALEVE) 220 MG tablet, Take 2 tablets by mouth Daily. HOLDING FOR SURGERY, Disp: , Rfl:     Omega-3 Fatty Acids (FISH OIL PO), Take 1 tablet by mouth Daily. HOLDING FOR SURGERY, Disp: , Rfl:     tamoxifen (NOLVADEX) 20 MG chemo tablet, Take 1 tablet by mouth Daily., Disp: 90 tablet, Rfl: 1    Objective   /60 (BP Location: Right arm, Patient Position: Sitting, Cuff Size: Adult)   Pulse 114   Temp 97.1 °F (36.2 °C) (Temporal)   Resp 16   Ht 175.3 cm (69.02\")   Wt 96.2 kg (212 lb)   SpO2 99%   BMI 31.29 kg/m²   BP Readings from Last 3 Encounters:   01/04/24 116/60   11/30/23 116/60   08/07/23 118/70     Wt Readings from Last 3 Encounters:   01/04/24 96.2 kg (212 lb)   11/30/23 95.3 kg (210 lb)   08/07/23 99 kg (218 lb 4.8 oz)     Physical Exam  Constitutional:       General: She is not in acute distress.  HENT:      Head: Normocephalic and atraumatic.      Right Ear: Tympanic membrane normal.      Left Ear: Tympanic membrane normal.      Nose: Nose normal.      Mouth/Throat:      Mouth: Mucous membranes are moist.      Pharynx: Oropharynx is clear. No posterior oropharyngeal erythema.   Eyes:      Extraocular Movements: Extraocular movements intact.      Conjunctiva/sclera: Conjunctivae normal.   Neck:      Comments: - Thyroid not enlarged  Cardiovascular:      Rate and Rhythm: Normal rate and regular rhythm.      Heart sounds: Normal heart sounds.   Pulmonary:      Effort: Pulmonary effort is normal.      Breath sounds: Normal breath sounds. No stridor. No wheezing.   Abdominal:      General: Abdomen is flat. Bowel sounds are normal.      Palpations: Abdomen is soft. There is no mass.      Tenderness: There is no abdominal tenderness. There is no guarding.   Musculoskeletal:         General: No swelling or deformity. Normal range of motion.      Cervical back: Normal range of motion and neck supple.   Skin:     General: Skin is warm " and dry.      Capillary Refill: Capillary refill takes less than 2 seconds.      Coloration: Skin is not jaundiced.      Findings: No rash.   Neurological:      General: No focal deficit present.      Mental Status: She is alert and oriented to person, place, and time.      Cranial Nerves: No cranial nerve deficit.      Motor: No weakness.      Coordination: Coordination normal.      Gait: Gait normal.      Deep Tendon Reflexes: Reflexes normal.   Psychiatric:         Mood and Affect: Mood normal.         Behavior: Behavior normal.         Thought Content: Thought content normal.             Assessment and Plan   Diagnoses and all orders for this visit:    1. Annual physical exam (Primary)  -Overall normal 57-year-old female exam.  -Last Pap smear was in 2022 they came back with normal cytology and negative for HPV    2. Vitamin D deficiency  -     Vitamin D,25-Hydroxy    3. Thyroid disorder screen  -     TSH Rfx On Abnormal To Free T4    4. Elevated cholesterol  -     Lipid panel    5. Elevated glucose  -     Hemoglobin A1c    6. BMI 31.0-31.9,adult   BMI is >= 30 and <35. (Class 1 Obesity). The following options were offered after discussion;: exercise counseling/recommendations and nutrition counseling/recommendations        Follow Up   - 1 year for annual physical exam      The patient was counseled regarding nutrition, physical activity, healthy weight, injury prevention, immunizations and preventative health screenings. Expected course, medications, and adverse effects discussed.  Call or return if worsening or persistent symptoms.  I wore protective equipment throughout this patient encounter including a mask and eye protection.   The complete contents of the Assessment and Plan and Data / Lab Results as documented above have been reviewed and addressed by myself with the patient today as part of an ongoing evaluation / treatment plan.

## 2024-01-15 NOTE — TELEPHONE ENCOUNTER
Spoke with Dalila and the following was reviewed.   Dalila reviewed results, patient was informed of 11 mm endometrial lining. We discussed scheduling an appointment with a physician in the office for possible endometrial biopsy. Patient states that she has been bleeding for 4 weeks and did start provera with no change. Per Dalila states she will prescribe Aygestin every four hours until bleeding stops patient was informed.   She was transferred to scheduling to set up appointment with physician, she was advised to go to the ER for evaluation if symptoms worsen and she agrees.   Please advise.

## 2024-01-16 PROBLEM — N93.9 ABNORMAL UTERINE BLEEDING (AUB): Status: ACTIVE | Noted: 2024-01-10

## 2024-01-17 ENCOUNTER — LAB (OUTPATIENT)
Dept: LAB | Facility: LAB | Age: 49
End: 2024-01-17
Payer: COMMERCIAL

## 2024-01-17 ENCOUNTER — PROCEDURE VISIT (OUTPATIENT)
Dept: OBSTETRICS AND GYNECOLOGY | Facility: CLINIC | Age: 49
End: 2024-01-17
Payer: COMMERCIAL

## 2024-01-17 VITALS — BODY MASS INDEX: 62.62 KG/M2 | DIASTOLIC BLOOD PRESSURE: 84 MMHG | WEIGHT: 293 LBS | SYSTOLIC BLOOD PRESSURE: 124 MMHG

## 2024-01-17 DIAGNOSIS — N93.9 ABNORMAL UTERINE BLEEDING (AUB): ICD-10-CM

## 2024-01-17 DIAGNOSIS — N93.9 ABNORMAL UTERINE BLEEDING (AUB): Primary | ICD-10-CM

## 2024-01-17 LAB
ERYTHROCYTE [DISTWIDTH] IN BLOOD BY AUTOMATED COUNT: 12.7 % (ref 11.5–14.5)
FSH SERPL-ACNC: 6.2 IU/L
HCT VFR BLD AUTO: 45.6 % (ref 36–46)
HGB BLD-MCNC: 14.7 G/DL (ref 12–16)
MCH RBC QN AUTO: 28.2 PG (ref 26–34)
MCHC RBC AUTO-ENTMCNC: 32.2 G/DL (ref 32–36)
MCV RBC AUTO: 88 FL (ref 80–100)
NRBC BLD-RTO: 0 /100 WBCS (ref 0–0)
PLATELET # BLD AUTO: 286 X10*3/UL (ref 150–450)
RBC # BLD AUTO: 5.21 X10*6/UL (ref 4–5.2)
WBC # BLD AUTO: 9 X10*3/UL (ref 4.4–11.3)

## 2024-01-17 PROCEDURE — 99214 OFFICE O/P EST MOD 30 MIN: CPT | Performed by: OBSTETRICS & GYNECOLOGY

## 2024-01-17 PROCEDURE — 85027 COMPLETE CBC AUTOMATED: CPT

## 2024-01-17 PROCEDURE — 83001 ASSAY OF GONADOTROPIN (FSH): CPT

## 2024-01-17 PROCEDURE — 36415 COLL VENOUS BLD VENIPUNCTURE: CPT

## 2024-01-17 PROCEDURE — 82681 ASSAY DIR MEAS FR ESTRADIOL: CPT

## 2024-01-17 PROCEDURE — 88305 TISSUE EXAM BY PATHOLOGIST: CPT

## 2024-01-17 PROCEDURE — 58100 BIOPSY OF UTERUS LINING: CPT | Performed by: OBSTETRICS & GYNECOLOGY

## 2024-01-17 PROCEDURE — 88305 TISSUE EXAM BY PATHOLOGIST: CPT | Performed by: PATHOLOGY

## 2024-01-17 PROCEDURE — 82670 ASSAY OF TOTAL ESTRADIOL: CPT

## 2024-01-17 RX ORDER — NORETHINDRONE 5 MG/1
TABLET ORAL
Qty: 42 TABLET | Refills: 3 | Status: SHIPPED | OUTPATIENT
Start: 2024-01-17 | End: 2024-03-05 | Stop reason: HOSPADM

## 2024-01-17 NOTE — PROGRESS NOTES
Subjective   Patient ID: Carolina Murrell is a 48 y.o. female who presents for Endometrial Biopsy (Irregular Bleeding ).  HPI  48-year-old with history of AUB.  Patient with normal menses prior to September.  Patient had light menses in September with no menses in November, then began bleeding on December 13 and has continued till now.  Patient initially treated with Provera with no improvement, and then started on Aygestin 3 days ago.  Bleeding has slowed down.  Patient's Pap is pending.  Patient had seen Dalila Burns and was referred to here for further evaluation.        Objective   Physical Exam  Exam conducted with a chaperone present.   Constitutional:       Appearance: Normal appearance. She is obese.   Abdominal:      Palpations: Abdomen is soft. There is no mass.      Tenderness: There is no abdominal tenderness.   Genitourinary:     General: Normal vulva.      Vagina: Normal. No lesions.      Cervix: No lesion.      Uterus: Normal. Not tender.       Adnexa: Right adnexa normal and left adnexa normal.        Right: No mass or tenderness.          Left: No mass or tenderness.     Neurological:      Mental Status: She is alert.   Psychiatric:         Mood and Affect: Mood normal.         Behavior: Behavior normal.     Patient ID: Carolina Murrell is a 48 y.o. female.    Endometrial biopsy    Date/Time: 1/17/2024 11:55 AM    Performed by: Mendoza Phelps MD  Authorized by: Mendoza Phelps MD    Consent:     Consent obtained: written    Consent given by: patient  Indications:     Indications: abnormal uterine bleeding      Chronicity of post-menopausal bleeding: new  Procedure:     A bimanual exam was performed: yes      Prepped with: Betadine  Findings:     Cervix: normal      Uterus depth by sound (cm): 8    Specimen collected: specimen collected and sent to pathology      Patient tolerance: tolerated well, no immediate complications      Assessment/Plan   Problem List Items Addressed This Visit              ICD-10-CM    Abnormal uterine bleeding (AUB) - Primary N93.9     ULTRASOUND (1/12/2024): Uterus 8.3 x 5.1 x 4.7 cm.  Endometrial thickness 11 mm.  Ovaries not well-visualized  -- AUB: Patient here for follow-up on her AUB.  Patient with normal monthly menses prior to September 2023.  Patient had a light menses in October and no menses in November.  Patient started bleeding December 13 and has had bleeding since then, intermittently heavy.  Patient initially on Provera daily, and with no improvement was started on Aygestin 3 days ago.  Bleeding has slowed down.  Discussed with the patient perimenopausal bleeding, hormonal etiologies, also risk of uterine cancer.  Would recommend diagnostic endometrial biopsy.  Also discussed treatment, would recommend cyclic Aygestin, 5 mg twice daily for 21 days off 7 and repeat.  Will continue for 3 months and follow-up in 3 months.  Obtain a CBC FSH and estradiol to check for anemia and confirm not menopausal.  Patient inquired about hysterectomy, would recommend observing at this time with hormonal treatment.  Did discuss increased surgical morbidity associated with obesity.  --Patient with normal Pap and HPV November 2021, and repeated January 2024, and pending  --Normal mammogram February 2023 and scheduled for repeat  --Patient is P1    PLAN:  CBC, FSH, and estradiol  Endometrial biopsy performed  Aygestin 5 mg twice daily for 21 days off 7 and repeat  Follow-up in 3 months to reevaluate bleeding  Due for annual exam January 2025         Relevant Orders    Surgical Pathology Exam    CBC    Follicle Stimulating Hormone    Estradiol Free and Total

## 2024-01-17 NOTE — ASSESSMENT & PLAN NOTE
ULTRASOUND (1/12/2024): Uterus 8.3 x 5.1 x 4.7 cm.  Endometrial thickness 11 mm.  Ovaries not well-visualized  -- AUB: Patient here for follow-up on her AUB.  Patient with normal monthly menses prior to September 2023.  Patient had a light menses in October and no menses in November.  Patient started bleeding December 13 and has had bleeding since then, intermittently heavy.  Patient initially on Provera daily, and with no improvement was started on Aygestin 3 days ago.  Bleeding has slowed down.  Discussed with the patient perimenopausal bleeding, hormonal etiologies, also risk of uterine cancer.  Would recommend diagnostic endometrial biopsy.  Also discussed treatment, would recommend cyclic Aygestin, 5 mg twice daily for 21 days off 7 and repeat.  Will continue for 3 months and follow-up in 3 months.  Obtain a CBC FSH and estradiol to check for anemia and confirm not menopausal.  Patient inquired about hysterectomy, would recommend observing at this time with hormonal treatment.  Did discuss increased surgical morbidity associated with obesity.  --Patient with normal Pap and HPV November 2021, and repeated January 2024, and pending  --Normal mammogram February 2023 and scheduled for repeat  --Patient is P1    PLAN:  CBC, FSH, and estradiol  Endometrial biopsy performed  Aygestin 5 mg twice daily for 21 days off 7 and repeat  Follow-up in 3 months to reevaluate bleeding  Due for annual exam January 2025

## 2024-01-25 LAB
ESTRADIOL (SJC): 32 PG/ML
ESTRADIOL FREE: 0.63 PG/ML

## 2024-01-29 ENCOUNTER — TELEPHONE (OUTPATIENT)
Dept: OBSTETRICS AND GYNECOLOGY | Facility: CLINIC | Age: 49
End: 2024-01-29
Payer: COMMERCIAL

## 2024-01-29 LAB
CYTOLOGY CMNT CVX/VAG CYTO-IMP: NORMAL
HPV HR 12 DNA GENITAL QL NAA+PROBE: NEGATIVE
HPV HR GENOTYPES PNL CVX NAA+PROBE: NEGATIVE
HPV16 DNA SPEC QL NAA+PROBE: NEGATIVE
HPV18 DNA SPEC QL NAA+PROBE: NEGATIVE
LAB AP HPV GENOTYPE QUESTION: YES
LAB AP HPV HR: NORMAL
LABORATORY COMMENT REPORT: NORMAL
LABORATORY COMMENT REPORT: NORMAL
LMP START DATE: NORMAL
PATH REPORT.TOTAL CANCER: NORMAL

## 2024-01-29 NOTE — TELEPHONE ENCOUNTER
Attempted to contact patient to inform her her pap will be repeated next year with Annual appointment due to too much blood on pap. Left message to call office.

## 2024-01-31 ENCOUNTER — OFFICE VISIT (OUTPATIENT)
Dept: PRIMARY CARE | Facility: CLINIC | Age: 49
End: 2024-01-31
Payer: COMMERCIAL

## 2024-01-31 VITALS
HEART RATE: 73 BPM | TEMPERATURE: 97 F | BODY MASS INDEX: 61.88 KG/M2 | DIASTOLIC BLOOD PRESSURE: 62 MMHG | SYSTOLIC BLOOD PRESSURE: 118 MMHG | OXYGEN SATURATION: 100 % | WEIGHT: 293 LBS

## 2024-01-31 DIAGNOSIS — E66.01 CLASS 3 SEVERE OBESITY DUE TO EXCESS CALORIES WITH SERIOUS COMORBIDITY AND BODY MASS INDEX (BMI) OF 60.0 TO 69.9 IN ADULT (MULTI): ICD-10-CM

## 2024-01-31 DIAGNOSIS — G25.81 RESTLESS LEG SYNDROME: ICD-10-CM

## 2024-01-31 DIAGNOSIS — J30.1 ALLERGIC RHINITIS DUE TO POLLEN, UNSPECIFIED SEASONALITY: Primary | ICD-10-CM

## 2024-01-31 PROCEDURE — 3074F SYST BP LT 130 MM HG: CPT | Performed by: INTERNAL MEDICINE

## 2024-01-31 PROCEDURE — 99214 OFFICE O/P EST MOD 30 MIN: CPT | Performed by: INTERNAL MEDICINE

## 2024-01-31 PROCEDURE — 3078F DIAST BP <80 MM HG: CPT | Performed by: INTERNAL MEDICINE

## 2024-01-31 PROCEDURE — 3008F BODY MASS INDEX DOCD: CPT | Performed by: INTERNAL MEDICINE

## 2024-01-31 PROCEDURE — 1036F TOBACCO NON-USER: CPT | Performed by: INTERNAL MEDICINE

## 2024-01-31 RX ORDER — GABAPENTIN 100 MG/1
100 CAPSULE ORAL NIGHTLY
Qty: 90 CAPSULE | Refills: 1 | Status: SHIPPED | OUTPATIENT
Start: 2024-01-31

## 2024-01-31 RX ORDER — MULTIVITAMIN/IRON/FOLIC ACID 18MG-0.4MG
1 TABLET ORAL DAILY
COMMUNITY

## 2024-01-31 RX ORDER — ALBUTEROL SULFATE 90 UG/1
AEROSOL, METERED RESPIRATORY (INHALATION)
Qty: 54 G | Refills: 1 | Status: SHIPPED | OUTPATIENT
Start: 2024-01-31

## 2024-01-31 RX ORDER — MONTELUKAST SODIUM 10 MG/1
10 TABLET ORAL DAILY
Qty: 90 TABLET | Refills: 1 | Status: SHIPPED | OUTPATIENT
Start: 2024-01-31

## 2024-01-31 RX ORDER — ROPINIROLE 2 MG/1
2 TABLET, FILM COATED ORAL NIGHTLY
Qty: 90 TABLET | Refills: 1 | Status: SHIPPED | OUTPATIENT
Start: 2024-01-31

## 2024-01-31 RX ORDER — FLUTICASONE PROPIONATE 50 MCG
2 SPRAY, SUSPENSION (ML) NASAL DAILY
Qty: 144 G | Refills: 1 | Status: SHIPPED | OUTPATIENT
Start: 2024-01-31

## 2024-01-31 ASSESSMENT — ENCOUNTER SYMPTOMS
CONSTITUTIONAL NEGATIVE: 1
ALLERGIC/IMMUNOLOGIC NEGATIVE: 1
PSYCHIATRIC NEGATIVE: 1
CARDIOVASCULAR NEGATIVE: 1
MUSCULOSKELETAL NEGATIVE: 1
HEMATOLOGIC/LYMPHATIC NEGATIVE: 1
NEUROLOGICAL NEGATIVE: 1
GASTROINTESTINAL NEGATIVE: 1
EYES NEGATIVE: 1
RESPIRATORY NEGATIVE: 1
ENDOCRINE NEGATIVE: 1

## 2024-01-31 NOTE — PROGRESS NOTES
Subjective   Patient ID: Carolina Murrell is a 48 y.o. female who presents for 6 month follow up .  Patient presents today in follow up re:   allergic rhinitis and restless legs.    Rhinitis Sx have been overall stable on current medical therapy.  No adverse effects of therapy.    Restless legs have not been as well controlled with therapy of late.  She has tried taking a second ropinirole with modestly good effect.  No adverse effects of therapy reported.        Review of Systems   Constitutional: Negative.    HENT: Negative.     Eyes: Negative.    Respiratory: Negative.     Cardiovascular: Negative.    Gastrointestinal: Negative.    Endocrine: Negative.    Genitourinary: Negative.    Musculoskeletal: Negative.    Skin: Negative.    Allergic/Immunologic: Negative.    Neurological: Negative.    Hematological: Negative.    Psychiatric/Behavioral: Negative.         Objective     Blood pressure 118/62, pulse 73, temperature 36.1 °C (97 °F), temperature source Temporal, weight (!) 174 kg (383 lb 6.4 oz), last menstrual period 12/13/2023, SpO2 100 %.   Physical Exam  Vitals reviewed.   Constitutional:       Appearance: Normal appearance. She is obese.   Neck:      Vascular: No carotid bruit.   Cardiovascular:      Rate and Rhythm: Normal rate and regular rhythm.      Pulses: Normal pulses.      Heart sounds: Normal heart sounds. No murmur heard.  Pulmonary:      Effort: Pulmonary effort is normal.      Breath sounds: Normal breath sounds. No wheezing or rales.   Abdominal:      General: Abdomen is flat. There is no distension.      Palpations: Abdomen is soft.      Tenderness: There is no abdominal tenderness.   Musculoskeletal:         General: Normal range of motion.      Cervical back: Normal range of motion and neck supple.   Skin:     General: Skin is warm and dry.   Neurological:      General: No focal deficit present.      Mental Status: She is alert and oriented to person, place, and time.   Psychiatric:          Mood and Affect: Mood normal.         Behavior: Behavior normal.         Assessment/Plan   Problem List Items Addressed This Visit       Allergic rhinitis - Primary    Relevant Medications    montelukast (Singulair) 10 mg tablet    fluticasone (Flonase) 50 mcg/actuation nasal spray    albuterol 90 mcg/actuation inhaler    Restless leg syndrome    Relevant Medications    rOPINIRole (Requip) 2 mg tablet    gabapentin (Neurontin) 100 mg capsule     Other Visit Diagnoses       Class 3 severe obesity due to excess calories with serious comorbidity and body mass index (BMI) of 60.0 to 69.9 in adult (CMS/[facility])              Rhinitis Sx well compensated on current therapy.  Will continue present therapy and follow.  Restless legs Sx overall well compensated on current therapy.  Will continue present therapy, and the addition of gabapentin has been helpful such that she does not need the gabapentin every night.  Will continue present therapy and follow.  Screening labs reviewed with overall excellent results.  Pt. advised on improving dietary choices and portion control as well as increasing exercise to promote weight loss.  [facility] and she states that she made multiple phone calls without getting her call answered and she got no return call after leaving messages.  We tried to help get her access at last OV without success.  It is noted that she has lost 28# on her own over the last 6 months and is encouraged.    Follow up in 6 months  Lab to be drawn 1 week prior to next office visit.

## 2024-02-05 ENCOUNTER — TELEPHONE (OUTPATIENT)
Dept: PRIMARY CARE | Facility: CLINIC | Age: 49
End: 2024-02-05
Payer: COMMERCIAL

## 2024-02-05 ENCOUNTER — TELEPHONE (OUTPATIENT)
Dept: OBSTETRICS AND GYNECOLOGY | Facility: CLINIC | Age: 49
End: 2024-02-05
Payer: COMMERCIAL

## 2024-02-05 LAB
LABORATORY COMMENT REPORT: NORMAL
PATH REPORT.FINAL DX SPEC: NORMAL
PATH REPORT.GROSS SPEC: NORMAL
PATH REPORT.RELEVANT HX SPEC: NORMAL
PATH REPORT.TOTAL CANCER: NORMAL

## 2024-02-05 NOTE — TELEPHONE ENCOUNTER
----- Message from Mendoza Phelps MD sent at 2/5/2024 12:31 PM EST -----  Regarding: Please call the patient  Patient with a history of AUB, endometrial biopsy revealed atypical endometrial hyperplasia.  Please notify the patient, would recommend follow-up appointment to discuss treatment options.  ----- Message -----  From: Lab, Background User  Sent: 2/5/2024  11:02 AM EST  To: Mendoza Phelps MD

## 2024-02-05 NOTE — TELEPHONE ENCOUNTER
Patient called in and wanted to let you know that she went to the [facility] urgent care and was diagnosed with a sinus infection and bronchitis, she is wanting to know if she should follow up with you and schedule a visit to be further evaluated. She is also requesting you take a look at her recent biopsy results.

## 2024-02-09 DIAGNOSIS — J20.9 ACUTE BRONCHITIS, UNSPECIFIED ORGANISM: Primary | ICD-10-CM

## 2024-02-09 RX ORDER — LEVOFLOXACIN 500 MG/1
500 TABLET, FILM COATED ORAL DAILY
Qty: 7 TABLET | Refills: 0 | Status: SHIPPED | OUTPATIENT
Start: 2024-02-09 | End: 2024-02-16

## 2024-02-12 ENCOUNTER — OFFICE VISIT (OUTPATIENT)
Dept: OBSTETRICS AND GYNECOLOGY | Facility: CLINIC | Age: 49
End: 2024-02-12
Payer: COMMERCIAL

## 2024-02-12 ENCOUNTER — PREP FOR PROCEDURE (OUTPATIENT)
Dept: OBSTETRICS AND GYNECOLOGY | Facility: CLINIC | Age: 49
End: 2024-02-12

## 2024-02-12 VITALS
DIASTOLIC BLOOD PRESSURE: 92 MMHG | WEIGHT: 293 LBS | BODY MASS INDEX: 47.09 KG/M2 | SYSTOLIC BLOOD PRESSURE: 132 MMHG | HEIGHT: 66 IN

## 2024-02-12 DIAGNOSIS — N85.02 ATYPICAL ENDOMETRIAL HYPERPLASIA: Primary | ICD-10-CM

## 2024-02-12 DIAGNOSIS — N93.9 ABNORMAL UTERINE BLEEDING (AUB): ICD-10-CM

## 2024-02-12 PROCEDURE — 3075F SYST BP GE 130 - 139MM HG: CPT | Performed by: OBSTETRICS & GYNECOLOGY

## 2024-02-12 PROCEDURE — 3008F BODY MASS INDEX DOCD: CPT | Performed by: OBSTETRICS & GYNECOLOGY

## 2024-02-12 PROCEDURE — 99214 OFFICE O/P EST MOD 30 MIN: CPT | Performed by: OBSTETRICS & GYNECOLOGY

## 2024-02-12 PROCEDURE — 1036F TOBACCO NON-USER: CPT | Performed by: OBSTETRICS & GYNECOLOGY

## 2024-02-12 PROCEDURE — 3080F DIAST BP >= 90 MM HG: CPT | Performed by: OBSTETRICS & GYNECOLOGY

## 2024-02-12 RX ORDER — ACETAMINOPHEN 325 MG/1
975 TABLET ORAL ONCE
Status: CANCELLED | OUTPATIENT
Start: 2024-02-12 | End: 2024-02-12

## 2024-02-12 RX ORDER — GABAPENTIN 600 MG/1
600 TABLET ORAL ONCE
Status: CANCELLED | OUTPATIENT
Start: 2024-02-12 | End: 2024-02-12

## 2024-02-12 RX ORDER — DEXTROMETHORPHAN HYDROBROMIDE, GUAIFENESIN AND PSEUDOEPHEDRINE HYDROCHLORIDE 15; 400; 60 MG/1; MG/1; MG/1
TABLET ORAL
COMMUNITY
Start: 2024-02-04

## 2024-02-12 NOTE — PROGRESS NOTES
Subjective   Patient ID: Carolina Murrell is a 48 y.o. female who presents for Follow-up (Denies problems.).  HPI  48-year-old here for follow-up.  Patient with biopsy revealing atypical endometrial hyperplasia.  Patient currently on Aygestin, and states her bleeding has stopped.        Objective   Physical Exam  Constitutional:       Appearance: Normal appearance. She is well-developed. She is obese.   Neurological:      Mental Status: She is alert.   Psychiatric:         Mood and Affect: Mood normal.         Behavior: Behavior normal.         Assessment/Plan   Problem List Items Addressed This Visit             ICD-10-CM    Abnormal uterine bleeding (AUB) N93.9    Atypical endometrial hyperplasia - Primary N85.02     ULTRASOUND (1/12/2024): Uterus 8.3 x 5.1 x 4.7 cm.  Endometrial thickness 11 mm.  Ovaries not well-visualized  LABS (1/17/2024): FSH 6.2, estradiol 32, Hgb 14.7    --ATYPICAL ENDOMETRIAL HYPERPLASIA: Patient with history of AUB.  Biopsy in January 2024 revealed atypical endometrial hyperplasia.  Patient currently on monthly Aygestin.  Discussed these results with the patient and her .  Would recommend D&C and hysteroscopy to rule out endometrial cancer.  Discussed potential endometrial cancer.  Also discussed treatment options for atypical endometrial hyperplasia including hysterectomy, Mirena IUD, or progestational therapy with Megace or Aygestin.  Discussed risk of surgery and referral to [facility] or [facility] due to morbid obesity.  Discussed option of inserting the IUD at time of D&C.  Will proceed with a D&C with hysteroscopy, and insertion of Mirena IUD.  Patient will continue to consider option of a hysterectomy.  -- AUB: Patient currently on Aygestin 5 mg twice daily.  Endometrial biopsy revealed atypical endometrial hyperplasia.  --Patient with normal Pap and HPV November 2021, repeat Pap January 2024 was inadequate with negative HPV.  Will repeat at time of D&C  --Normal mammogram February  2023 and scheduled for repeat  --Patient is P1    A D&C and Hysteroscopy was discussed with the patient.  Potential complications including infections, venous thrombotic complications, bleeding, and risks of anethesia were discussed.  All questions were answered.    PLAN:  Consent and schedule for D&C with hysteroscopy and insertion of Mirena IUD

## 2024-02-12 NOTE — ASSESSMENT & PLAN NOTE
ULTRASOUND (1/12/2024): Uterus 8.3 x 5.1 x 4.7 cm.  Endometrial thickness 11 mm.  Ovaries not well-visualized  LABS (1/17/2024): FSH 6.2, estradiol 32, Hgb 14.7    --ATYPICAL ENDOMETRIAL HYPERPLASIA: Patient with history of AUB.  Biopsy in January 2024 revealed atypical endometrial hyperplasia.  Patient currently on monthly Aygestin.  Discussed these results with the patient and her .  Would recommend D&C and hysteroscopy to rule out endometrial cancer.  Discussed potential endometrial cancer.  Also discussed treatment options for atypical endometrial hyperplasia including hysterectomy, Mirena IUD, or progestational therapy with Megace or Aygestin.  Discussed risk of surgery and referral to [facility] or [facility] due to morbid obesity.  Discussed option of inserting the IUD at time of D&C.  Will proceed with a D&C with hysteroscopy, and insertion of Mirena IUD.  Patient will continue to consider option of a hysterectomy.  -- AUB: Patient currently on Aygestin 5 mg twice daily.  Endometrial biopsy revealed atypical endometrial hyperplasia.  --Patient with normal Pap and HPV November 2021, repeat Pap January 2024 was inadequate with negative HPV.  Will repeat at time of D&C  --Normal mammogram February 2023 and scheduled for repeat  --Patient is P1    A D&C and Hysteroscopy was discussed with the patient.  Potential complications including infections, venous thrombotic complications, bleeding, and risks of anethesia were discussed.  All questions were answered.    PLAN:  Consent and schedule for D&C with hysteroscopy and insertion of Mirena IUD

## 2024-02-13 ENCOUNTER — APPOINTMENT (OUTPATIENT)
Dept: PRIMARY CARE | Facility: CLINIC | Age: 49
End: 2024-02-13
Payer: COMMERCIAL

## 2024-02-14 ENCOUNTER — HOSPITAL ENCOUNTER (OUTPATIENT)
Dept: RADIOLOGY | Facility: HOSPITAL | Age: 49
Discharge: HOME | End: 2024-02-14
Payer: COMMERCIAL

## 2024-02-14 DIAGNOSIS — Z12.31 SCREENING MAMMOGRAM FOR BREAST CANCER: ICD-10-CM

## 2024-02-14 PROCEDURE — 77067 SCR MAMMO BI INCL CAD: CPT

## 2024-02-14 PROCEDURE — 77067 SCR MAMMO BI INCL CAD: CPT | Performed by: RADIOLOGY

## 2024-02-14 PROCEDURE — 77063 BREAST TOMOSYNTHESIS BI: CPT | Performed by: RADIOLOGY

## 2024-02-16 ENCOUNTER — TELEPHONE (OUTPATIENT)
Dept: OBSTETRICS AND GYNECOLOGY | Facility: CLINIC | Age: 49
End: 2024-02-16
Payer: COMMERCIAL

## 2024-02-22 ENCOUNTER — PRE-ADMISSION TESTING (OUTPATIENT)
Dept: PREADMISSION TESTING | Facility: HOSPITAL | Age: 49
End: 2024-02-22
Payer: COMMERCIAL

## 2024-02-22 VITALS
BODY MASS INDEX: 47.09 KG/M2 | WEIGHT: 293 LBS | HEIGHT: 66 IN | HEART RATE: 74 BPM | TEMPERATURE: 98.7 F | RESPIRATION RATE: 20 BRPM | DIASTOLIC BLOOD PRESSURE: 86 MMHG | SYSTOLIC BLOOD PRESSURE: 137 MMHG

## 2024-02-22 DIAGNOSIS — Z01.818 ENCOUNTER FOR PREADMISSION TESTING: Primary | ICD-10-CM

## 2024-02-22 PROCEDURE — 93010 ELECTROCARDIOGRAM REPORT: CPT | Performed by: INTERNAL MEDICINE

## 2024-02-22 PROCEDURE — 99203 OFFICE O/P NEW LOW 30 MIN: CPT | Performed by: NURSE PRACTITIONER

## 2024-02-22 PROCEDURE — 93005 ELECTROCARDIOGRAM TRACING: CPT

## 2024-02-22 ASSESSMENT — CHADS2 SCORE
AGE GREATER THAN OR EQUAL TO 75: NO
DIABETES: NO
PRIOR STROKE OR TIA OR THROMBOEMBOLISM: NO
AGE GREATER THAN OR EQUAL TO 75: NO
CHF: NO
HYPERTENSION: YES
CHADS2 SCORE: 1

## 2024-02-22 ASSESSMENT — DUKE ACTIVITY SCORE INDEX (DASI)
CAN YOU PARTICIPATE IN MODERATE RECREATIONAL ACTIVITIES LIKE GOLF, BOWLING, DANCING, DOUBLES TENNIS OR THROWING A BASEBALL OR FOOTBALL: YES
CAN YOU DO YARD WORK LIKE RAKING LEAVES, WEEDING OR PUSHING A MOWER: YES
CAN YOU PARTICIPATE IN STRENOUS SPORTS LIKE SWIMMING, SINGLES TENNIS, FOOTBALL, BASKETBALL, OR SKIING: YES
CAN YOU RUN A SHORT DISTANCE: YES
CAN YOU CLIMB A FLIGHT OF STAIRS OR WALK UP A HILL: YES
CAN YOU DO MODERATE WORK AROUND THE HOUSE LIKE VACUUMING, SWEEPING FLOORS OR CARRYING GROCERIES: YES
CAN YOU TAKE CARE OF YOURSELF (EAT, DRESS, BATHE, OR USE TOILET): YES
CAN YOU DO HEAVY WORK AROUND THE HOUSE LIKE SCRUBBING FLOORS OR LIFTING AND MOVING HEAVY FURNITURE: YES
CAN YOU WALK INDOORS, SUCH AS AROUND YOUR HOUSE: YES
CAN YOU WALK A BLOCK OR TWO ON LEVEL GROUND: YES
CAN YOU DO LIGHT WORK AROUND THE HOUSE LIKE DUSTING OR WASHING DISHES: YES

## 2024-02-22 ASSESSMENT — ENCOUNTER SYMPTOMS
EYES NEGATIVE: 1
CHILLS: 0
NEUROLOGICAL NEGATIVE: 1
ENDOCRINE NEGATIVE: 1
GASTROINTESTINAL NEGATIVE: 1
JOINT SWELLING: 1
FEVER: 0
NECK NEGATIVE: 1

## 2024-02-22 ASSESSMENT — LIFESTYLE VARIABLES: SMOKING_STATUS: NONSMOKER

## 2024-02-22 NOTE — CPM/PAT H&P
CPM/PAT Evaluation       Name: Carolina Murrell (Carolina Murrell)  /Age: 1975/48 y.o.     In-Person       Chief Complaint: Pre-Admission Testing  HPI- 49 yo female presents for Scheduled 24 Hysteroscopy D&C    No past medical history on file.    Past Surgical History:   Procedure Laterality Date    OTHER SURGICAL HISTORY  2019    Tubal ligation bilateral    OTHER SURGICAL HISTORY  2019    Ankle fracture repair    OTHER SURGICAL HISTORY  2019    Nasal septoplasty       Patient  reports being sexually active and has had partner(s) who are male. She reports using the following method of birth control/protection: OCP.    Family History   Problem Relation Name Age of Onset    Hypothyroidism Mother      Breast cancer Neg Hx         Allergies   Allergen Reactions    Penicillins Anaphylaxis, Itching and Swelling    Sulfa (Sulfonamide Antibiotics) Fever, Hives, Nausea Only and Other       Prior to Admission medications    Medication Sig Start Date End Date Taking? Authorizing Provider   albuterol 90 mcg/actuation inhaler 2 puffs q 4-6 hours as needed 24   Bryson Burton MD   azelastine (Astelin) 137 mcg (0.1 %) nasal spray Administer into affected nostril(s) twice a day. 21   Historical Provider, MD damon complex 0.4 mg tablet Take 1 tablet by mouth once daily.    Historical Provider, MD   Capmist DM 60- mg tablet TAKE 1 TABLET BY MOUTH EVERY 6 HOURS FOR 5 DAYS AS NEEDED 24   Historical Provider, MD   cholecalciferol (Vitamin D3) 1,250 mcg (50,000 unit) tablet Take by mouth. 19   Historical Provider, MD   clobetasol (Temovate) 0.05 % ointment Clobetasol Propionate 0.05 % External Ointment   Quantity: 60  Refills: 0        Start : 2021   Active 21   Historical Provider, MD   fluticasone (Flonase) 50 mcg/actuation nasal spray Administer 2 sprays into each nostril once daily. 24   Bryson Burton MD   gabapentin (Neurontin) 100 mg capsule Take 1 capsule  (100 mg) by mouth once daily at bedtime. 1/31/24   Bryson Burton MD   ibuprofen 600 mg tablet Take 1 tablet (600 mg) by mouth 3 times a day as needed. 2/21/20   Historical Provider, MD   ipratropium-albuteroL (Duo-Neb) 0.5-2.5 mg/3 mL nebulizer solution  3/6/23   Historical Provider, MD   levoFLOXacin (Levaquin) 500 mg tablet Take 1 tablet (500 mg) by mouth once daily for 7 days. 2/9/24 2/16/24  Bryson Burton MD   magnesium 200 mg tablet Take by mouth. 11/20/19   Historical Provider, MD   mometasone (Elocon) 0.1 % cream APPLY THIN LAYER TOPICALLY TO THE AFFECTED AREA EVERY DAY AS NEEDED FOR ITCHING 5/20/21   Historical Provider, MD   montelukast (Singulair) 10 mg tablet Take 1 tablet (10 mg) by mouth once daily. 1/31/24   Bryson Burton MD   norethindrone (Aygestin) 5 mg tablet Take one tablet by mouth two times each day for 21 days, then off medication for 7 days 1/17/24   Mendoza Phelps MD   rOPINIRole (Requip) 2 mg tablet Take 1 tablet (2 mg) by mouth once daily at bedtime. 1/31/24   Bryson Burton MD   Symbicort 160-4.5 mcg/actuation inhaler Inhale 2 puffs 2 times a day. Rinse mouth after use 3/1/23   Historical Provider, MD HERNANDEZ ROS:   Constitutional:    no fever   no chills  Neuro/Psych:   neg    Eyes:   neg    Ears:   neg    Nose:   neg    Mouth:   neg    Throat:   neg    Neck:   neg    Cardio:   Respiratory:   Endocrine:   neg    GI:   neg    :   neg    Musculoskeletal:    swelling (left lower leg- due to a old ankle fracture)  Hematologic:   neg     no history of blood transfusion   no blood clots  Skin:  neg        Physical Exam  Constitutional:       General: She is not in acute distress.     Appearance: She is obese. She is not ill-appearing.   HENT:      Head: Normocephalic.      Mouth/Throat:      Mouth: Mucous membranes are moist.   Cardiovascular:      Rate and Rhythm: Normal rate and regular rhythm.   Pulmonary:      Effort: Pulmonary effort is normal.      Breath sounds: Normal  breath sounds.   Abdominal:      Palpations: Abdomen is soft.   Musculoskeletal:      Cervical back: Normal range of motion and neck supple.      Left lower leg: Edema (+2) present.   Skin:     General: Skin is warm and dry.   Neurological:      Mental Status: She is alert and oriented to person, place, and time.   Psychiatric:         Mood and Affect: Mood normal.         Behavior: Behavior normal.          Airway    There were no vitals taken for this visit.    DASI Risk Score    No data to display       Caprini DVT Assessment    No data to display       Modified Frailty Index    No data to display       CHADS2 Stroke Risk  Current as of 4 hours ago        N/A 3 - 100%: High Risk   2 - 3%: Medium Risk   0 - 2%: Low Risk     Last Change: N/A          This score determines the patient's risk of having a stroke if the patient has atrial fibrillation.        This score is not applicable to this patient. Components are not calculated.          Revised Cardiac Risk Index    No data to display       Apfel Simplified Score    No data to display       Risk Analysis Index Results This Encounter    No data found in the last 1 encounters.         Assessment and Plan:  Labs completed 1/1/24  EKG Completed and Read  MD Cage Assessed Airway  H&P Completed   All Pre-Admission Questions addressed and answered.  Patient verbalized an understanding

## 2024-02-22 NOTE — PREPROCEDURE INSTRUCTIONS
Medication List            Accurate as of February 22, 2024  9:44 AM. Always use your most recent med list.                albuterol 90 mcg/actuation inhaler  2 puffs q 4-6 hours as needed  Medication Adjustments for Surgery: Continue until night before surgery     azelastine 137 mcg (0.1 %) nasal spray  Commonly known as: Astelin  Medication Adjustments for Surgery: Continue until night before surgery     b complex 0.4 mg tablet  Medication Adjustments for Surgery: Continue until night before surgery     Capmist DM 60- mg tablet  Generic drug: pseudoephedrine-DM-guaifenesin  Medication Adjustments for Surgery: Continue until night before surgery     cholecalciferol 1,250 mcg (50,000 unit) tablet  Commonly known as: Vitamin D3  Medication Adjustments for Surgery: Continue until night before surgery     clobetasol 0.05 % ointment  Commonly known as: Temovate  Medication Adjustments for Surgery: Continue until night before surgery     fluticasone 50 mcg/actuation nasal spray  Commonly known as: Flonase  Administer 2 sprays into each nostril once daily.  Medication Adjustments for Surgery: Continue until night before surgery     gabapentin 100 mg capsule  Commonly known as: Neurontin  Take 1 capsule (100 mg) by mouth once daily at bedtime.  Medication Adjustments for Surgery: Continue until night before surgery     ibuprofen 600 mg tablet  Medication Adjustments for Surgery: Continue until night before surgery     ipratropium-albuteroL 0.5-2.5 mg/3 mL nebulizer solution  Commonly known as: Duo-Neb  Medication Adjustments for Surgery: Continue until night before surgery     magnesium 200 mg tablet  Medication Adjustments for Surgery: Continue until night before surgery     mometasone 0.1 % cream  Commonly known as: Elocon  Medication Adjustments for Surgery: Continue until night before surgery     montelukast 10 mg tablet  Commonly known as: Singulair  Take 1 tablet (10 mg) by mouth once daily.  Medication  Adjustments for Surgery: Continue until night before surgery     norethindrone 5 mg tablet  Commonly known as: Aygestin  Take one tablet by mouth two times each day for 21 days, then off medication for 7 days  Medication Adjustments for Surgery: Continue until night before surgery     rOPINIRole 2 mg tablet  Commonly known as: Requip  Take 1 tablet (2 mg) by mouth once daily at bedtime.  Medication Adjustments for Surgery: Continue until night before surgery     Symbicort 160-4.5 mcg/actuation inhaler  Generic drug: budesonide-formoteroL  Medication Adjustments for Surgery: Continue until night before surgery                              NPO Instructions:    Do not eat any food after midnight the night before your surgery/procedure.    Additional Instructions:     The Day before Surgery:  Review your medication instructions, stop indicated medications  You will be contacted regarding the time of your arrival to facility and surgery time  Do not eat any food after Midnight  Go to admitting upon arrival at the hospital.  Bring valid ID and insurance card.  Wear comfortable clothes, easy to get on and off.   Keep valuables at home.   Need a  to take you home.  No uber or busses, cabs.

## 2024-02-26 LAB
ATRIAL RATE: 67 BPM
P AXIS: -11 DEGREES
PR INTERVAL: 158 MS
Q ONSET: 252 MS
QRS COUNT: 11 BEATS
QRS DURATION: 100 MS
QT INTERVAL: 430 MS
QTC CALCULATION(BAZETT): 458 MS
QTC FREDERICIA: 448 MS
R AXIS: -36 DEGREES
T AXIS: 24 DEGREES
T OFFSET: 467 MS
VENTRICULAR RATE: 68 BPM

## 2024-03-01 ENCOUNTER — ANESTHESIA EVENT (OUTPATIENT)
Dept: OPERATING ROOM | Facility: HOSPITAL | Age: 49
End: 2024-03-01
Payer: COMMERCIAL

## 2024-03-05 ENCOUNTER — ANESTHESIA (OUTPATIENT)
Dept: OPERATING ROOM | Facility: HOSPITAL | Age: 49
End: 2024-03-05
Payer: COMMERCIAL

## 2024-03-05 ENCOUNTER — HOSPITAL ENCOUNTER (OUTPATIENT)
Facility: HOSPITAL | Age: 49
Setting detail: OUTPATIENT SURGERY
Discharge: HOME | End: 2024-03-05
Attending: OBSTETRICS & GYNECOLOGY | Admitting: OBSTETRICS & GYNECOLOGY
Payer: COMMERCIAL

## 2024-03-05 ENCOUNTER — APPOINTMENT (OUTPATIENT)
Dept: CARDIOLOGY | Facility: HOSPITAL | Age: 49
End: 2024-03-05
Payer: COMMERCIAL

## 2024-03-05 VITALS
RESPIRATION RATE: 16 BRPM | TEMPERATURE: 96.6 F | DIASTOLIC BLOOD PRESSURE: 75 MMHG | OXYGEN SATURATION: 99 % | SYSTOLIC BLOOD PRESSURE: 129 MMHG | BODY MASS INDEX: 47.09 KG/M2 | WEIGHT: 293 LBS | HEART RATE: 60 BPM | HEIGHT: 66 IN

## 2024-03-05 DIAGNOSIS — N85.02 ATYPICAL ENDOMETRIAL HYPERPLASIA: ICD-10-CM

## 2024-03-05 PROBLEM — E66.813 CLASS 3 SEVERE OBESITY IN ADULT: Status: ACTIVE | Noted: 2024-03-05

## 2024-03-05 PROBLEM — E66.01 CLASS 3 SEVERE OBESITY IN ADULT (MULTI): Status: ACTIVE | Noted: 2024-03-05

## 2024-03-05 LAB
ATRIAL RATE: 69 BPM
P AXIS: -3 DEGREES
PR INTERVAL: 150 MS
PREGNANCY TEST URINE, POC: NEGATIVE
Q ONSET: 252 MS
QRS COUNT: 11 BEATS
QRS DURATION: 100 MS
QT INTERVAL: 431 MS
QTC CALCULATION(BAZETT): 462 MS
QTC FREDERICIA: 451 MS
R AXIS: -42 DEGREES
T AXIS: 24 DEGREES
T OFFSET: 468 MS
VENTRICULAR RATE: 69 BPM

## 2024-03-05 PROCEDURE — 7100000010 HC PHASE TWO TIME - EACH INCREMENTAL 1 MINUTE: Performed by: OBSTETRICS & GYNECOLOGY

## 2024-03-05 PROCEDURE — 3700000001 HC GENERAL ANESTHESIA TIME - INITIAL BASE CHARGE: Performed by: OBSTETRICS & GYNECOLOGY

## 2024-03-05 PROCEDURE — 93010 ELECTROCARDIOGRAM REPORT: CPT | Performed by: INTERNAL MEDICINE

## 2024-03-05 PROCEDURE — 3600000007 HC OR TIME - EACH INCREMENTAL 1 MINUTE - PROCEDURE LEVEL TWO: Performed by: OBSTETRICS & GYNECOLOGY

## 2024-03-05 PROCEDURE — 7100000002 HC RECOVERY ROOM TIME - EACH INCREMENTAL 1 MINUTE: Performed by: OBSTETRICS & GYNECOLOGY

## 2024-03-05 PROCEDURE — 2500000001 HC RX 250 WO HCPCS SELF ADMINISTERED DRUGS (ALT 637 FOR MEDICARE OP): Performed by: OBSTETRICS & GYNECOLOGY

## 2024-03-05 PROCEDURE — 88305 TISSUE EXAM BY PATHOLOGIST: CPT | Mod: TC,PORLAB | Performed by: OBSTETRICS & GYNECOLOGY

## 2024-03-05 PROCEDURE — 2500000001 HC RX 250 WO HCPCS SELF ADMINISTERED DRUGS (ALT 637 FOR MEDICARE OP): Performed by: ANESTHESIOLOGY

## 2024-03-05 PROCEDURE — 58558 HYSTEROSCOPY BIOPSY: CPT | Performed by: OBSTETRICS & GYNECOLOGY

## 2024-03-05 PROCEDURE — 3700000002 HC GENERAL ANESTHESIA TIME - EACH INCREMENTAL 1 MINUTE: Performed by: OBSTETRICS & GYNECOLOGY

## 2024-03-05 PROCEDURE — 7100000009 HC PHASE TWO TIME - INITIAL BASE CHARGE: Performed by: OBSTETRICS & GYNECOLOGY

## 2024-03-05 PROCEDURE — 6360000003 HC OR 636 NO HCPCS: Performed by: OBSTETRICS & GYNECOLOGY

## 2024-03-05 PROCEDURE — 88305 TISSUE EXAM BY PATHOLOGIST: CPT | Performed by: PATHOLOGY

## 2024-03-05 PROCEDURE — 2500000004 HC RX 250 GENERAL PHARMACY W/ HCPCS (ALT 636 FOR OP/ED): Performed by: LICENSED PRACTICAL NURSE

## 2024-03-05 PROCEDURE — 2500000005 HC RX 250 GENERAL PHARMACY W/O HCPCS: Performed by: LICENSED PRACTICAL NURSE

## 2024-03-05 PROCEDURE — 58300 INSERT INTRAUTERINE DEVICE: CPT | Performed by: OBSTETRICS & GYNECOLOGY

## 2024-03-05 PROCEDURE — 2500000005 HC RX 250 GENERAL PHARMACY W/O HCPCS: Performed by: ANESTHESIOLOGY

## 2024-03-05 PROCEDURE — 93005 ELECTROCARDIOGRAM TRACING: CPT | Mod: 59

## 2024-03-05 PROCEDURE — 81025 URINE PREGNANCY TEST: CPT | Performed by: ANESTHESIOLOGY

## 2024-03-05 PROCEDURE — 7100000001 HC RECOVERY ROOM TIME - INITIAL BASE CHARGE: Performed by: OBSTETRICS & GYNECOLOGY

## 2024-03-05 PROCEDURE — 2500000004 HC RX 250 GENERAL PHARMACY W/ HCPCS (ALT 636 FOR OP/ED): Performed by: ANESTHESIOLOGY

## 2024-03-05 PROCEDURE — 3600000002 HC OR TIME - INITIAL BASE CHARGE - PROCEDURE LEVEL TWO: Performed by: OBSTETRICS & GYNECOLOGY

## 2024-03-05 PROCEDURE — 2500000004 HC RX 250 GENERAL PHARMACY W/ HCPCS (ALT 636 FOR OP/ED): Performed by: OBSTETRICS & GYNECOLOGY

## 2024-03-05 DEVICE — SYSTEM, INTRAUTERINE MIRENA: Type: IMPLANTABLE DEVICE | Site: UTERUS | Status: FUNCTIONAL

## 2024-03-05 RX ORDER — SODIUM CITRATE AND CITRIC ACID MONOHYDRATE 334; 500 MG/5ML; MG/5ML
30 SOLUTION ORAL ONCE
Status: COMPLETED | OUTPATIENT
Start: 2024-03-05 | End: 2024-03-05

## 2024-03-05 RX ORDER — FAMOTIDINE 10 MG/ML
20 INJECTION INTRAVENOUS ONCE
Status: COMPLETED | OUTPATIENT
Start: 2024-03-05 | End: 2024-03-05

## 2024-03-05 RX ORDER — FENTANYL CITRATE 50 UG/ML
INJECTION, SOLUTION INTRAMUSCULAR; INTRAVENOUS AS NEEDED
Status: DISCONTINUED | OUTPATIENT
Start: 2024-03-05 | End: 2024-03-05

## 2024-03-05 RX ORDER — ROCURONIUM BROMIDE 50 MG/5 ML
SYRINGE (ML) INTRAVENOUS AS NEEDED
Status: DISCONTINUED | OUTPATIENT
Start: 2024-03-05 | End: 2024-03-05

## 2024-03-05 RX ORDER — METOCLOPRAMIDE HYDROCHLORIDE 5 MG/ML
10 INJECTION INTRAMUSCULAR; INTRAVENOUS ONCE
Status: COMPLETED | OUTPATIENT
Start: 2024-03-05 | End: 2024-03-05

## 2024-03-05 RX ORDER — IPRATROPIUM BROMIDE AND ALBUTEROL SULFATE 2.5; .5 MG/3ML; MG/3ML
3 SOLUTION RESPIRATORY (INHALATION) ONCE
Status: DISCONTINUED | OUTPATIENT
Start: 2024-03-05 | End: 2024-03-05 | Stop reason: HOSPADM

## 2024-03-05 RX ORDER — PROPOFOL 10 MG/ML
INJECTION, EMULSION INTRAVENOUS AS NEEDED
Status: DISCONTINUED | OUTPATIENT
Start: 2024-03-05 | End: 2024-03-05

## 2024-03-05 RX ORDER — ONDANSETRON HYDROCHLORIDE 2 MG/ML
4 INJECTION, SOLUTION INTRAVENOUS ONCE
Status: COMPLETED | OUTPATIENT
Start: 2024-03-05 | End: 2024-03-05

## 2024-03-05 RX ORDER — ACETAMINOPHEN 325 MG/1
975 TABLET ORAL ONCE
Status: COMPLETED | OUTPATIENT
Start: 2024-03-05 | End: 2024-03-05

## 2024-03-05 RX ORDER — MORPHINE SULFATE 2 MG/ML
2 INJECTION, SOLUTION INTRAMUSCULAR; INTRAVENOUS EVERY 5 MIN PRN
Status: DISCONTINUED | OUTPATIENT
Start: 2024-03-05 | End: 2024-03-05 | Stop reason: HOSPADM

## 2024-03-05 RX ORDER — MORPHINE SULFATE 4 MG/ML
4 INJECTION INTRAVENOUS EVERY 5 MIN PRN
Status: DISCONTINUED | OUTPATIENT
Start: 2024-03-05 | End: 2024-03-05 | Stop reason: HOSPADM

## 2024-03-05 RX ORDER — DEXAMETHASONE SODIUM PHOSPHATE 4 MG/ML
INJECTION, SOLUTION INTRA-ARTICULAR; INTRALESIONAL; INTRAMUSCULAR; INTRAVENOUS; SOFT TISSUE AS NEEDED
Status: DISCONTINUED | OUTPATIENT
Start: 2024-03-05 | End: 2024-03-05

## 2024-03-05 RX ORDER — ONDANSETRON HYDROCHLORIDE 2 MG/ML
INJECTION, SOLUTION INTRAVENOUS AS NEEDED
Status: DISCONTINUED | OUTPATIENT
Start: 2024-03-05 | End: 2024-03-05

## 2024-03-05 RX ORDER — SODIUM CHLORIDE, SODIUM LACTATE, POTASSIUM CHLORIDE, CALCIUM CHLORIDE 600; 310; 30; 20 MG/100ML; MG/100ML; MG/100ML; MG/100ML
50 INJECTION, SOLUTION INTRAVENOUS CONTINUOUS
Status: DISCONTINUED | OUTPATIENT
Start: 2024-03-05 | End: 2024-03-05 | Stop reason: HOSPADM

## 2024-03-05 RX ORDER — ONDANSETRON HYDROCHLORIDE 2 MG/ML
4 INJECTION, SOLUTION INTRAVENOUS ONCE AS NEEDED
Status: DISCONTINUED | OUTPATIENT
Start: 2024-03-05 | End: 2024-03-05 | Stop reason: HOSPADM

## 2024-03-05 RX ORDER — LIDOCAINE HCL/PF 100 MG/5ML
SYRINGE (ML) INTRAVENOUS AS NEEDED
Status: DISCONTINUED | OUTPATIENT
Start: 2024-03-05 | End: 2024-03-05

## 2024-03-05 RX ORDER — GABAPENTIN 300 MG/1
600 CAPSULE ORAL ONCE
Status: COMPLETED | OUTPATIENT
Start: 2024-03-05 | End: 2024-03-05

## 2024-03-05 RX ORDER — KETOROLAC TROMETHAMINE 30 MG/ML
INJECTION, SOLUTION INTRAMUSCULAR; INTRAVENOUS AS NEEDED
Status: DISCONTINUED | OUTPATIENT
Start: 2024-03-05 | End: 2024-03-05

## 2024-03-05 RX ADMIN — SUGAMMADEX 200 MG: 100 INJECTION, SOLUTION INTRAVENOUS at 09:13

## 2024-03-05 RX ADMIN — METOCLOPRAMIDE 10 MG: 5 INJECTION, SOLUTION INTRAMUSCULAR; INTRAVENOUS at 08:02

## 2024-03-05 RX ADMIN — SODIUM CHLORIDE, POTASSIUM CHLORIDE, SODIUM LACTATE AND CALCIUM CHLORIDE 50 ML/HR: 600; 310; 30; 20 INJECTION, SOLUTION INTRAVENOUS at 08:02

## 2024-03-05 RX ADMIN — FENTANYL CITRATE 50 MCG: 50 INJECTION INTRAMUSCULAR; INTRAVENOUS at 09:06

## 2024-03-05 RX ADMIN — KETOROLAC TROMETHAMINE 30 MG: 30 INJECTION, SOLUTION INTRAMUSCULAR at 09:13

## 2024-03-05 RX ADMIN — LIDOCAINE HYDROCHLORIDE 50 MG: 20 INJECTION, SOLUTION INTRAVENOUS at 08:56

## 2024-03-05 RX ADMIN — Medication 25 MG: at 08:56

## 2024-03-05 RX ADMIN — NITROGLYCERIN 0.5 INCH: 20 OINTMENT TOPICAL at 08:02

## 2024-03-05 RX ADMIN — FENTANYL CITRATE 50 MCG: 50 INJECTION INTRAMUSCULAR; INTRAVENOUS at 08:56

## 2024-03-05 RX ADMIN — FAMOTIDINE 20 MG: 10 INJECTION, SOLUTION INTRAVENOUS at 08:02

## 2024-03-05 RX ADMIN — Medication: at 07:00

## 2024-03-05 RX ADMIN — DEXAMETHASONE SODIUM PHOSPHATE 8 MG: 4 INJECTION INTRA-ARTICULAR; INTRALESIONAL; INTRAMUSCULAR; INTRAVENOUS; SOFT TISSUE at 08:59

## 2024-03-05 RX ADMIN — SODIUM CITRATE AND CITRIC ACID MONOHYDRATE 30 ML: 500; 334 SOLUTION ORAL at 08:02

## 2024-03-05 RX ADMIN — ONDANSETRON 4 MG: 2 INJECTION INTRAMUSCULAR; INTRAVENOUS at 09:13

## 2024-03-05 RX ADMIN — GABAPENTIN 600 MG: 300 CAPSULE ORAL at 08:02

## 2024-03-05 RX ADMIN — PROPOFOL 200 MG: 10 INJECTION, EMULSION INTRAVENOUS at 08:56

## 2024-03-05 RX ADMIN — ONDANSETRON 4 MG: 2 INJECTION INTRAMUSCULAR; INTRAVENOUS at 08:02

## 2024-03-05 RX ADMIN — ACETAMINOPHEN 975 MG: 325 TABLET ORAL at 08:02

## 2024-03-05 SDOH — HEALTH STABILITY: MENTAL HEALTH: CURRENT SMOKER: 0

## 2024-03-05 ASSESSMENT — PAIN SCALES - GENERAL
PAINLEVEL_OUTOF10: 2
PAINLEVEL_OUTOF10: 1
PAINLEVEL_OUTOF10: 2
PAINLEVEL_OUTOF10: 1
PAIN_LEVEL: 2

## 2024-03-05 ASSESSMENT — PAIN - FUNCTIONAL ASSESSMENT
PAIN_FUNCTIONAL_ASSESSMENT: 0-10

## 2024-03-05 ASSESSMENT — PAIN DESCRIPTION - DESCRIPTORS
DESCRIPTORS: CRAMPING

## 2024-03-05 ASSESSMENT — COLUMBIA-SUICIDE SEVERITY RATING SCALE - C-SSRS
2. HAVE YOU ACTUALLY HAD ANY THOUGHTS OF KILLING YOURSELF?: NO
1. IN THE PAST MONTH, HAVE YOU WISHED YOU WERE DEAD OR WISHED YOU COULD GO TO SLEEP AND NOT WAKE UP?: NO
6. HAVE YOU EVER DONE ANYTHING, STARTED TO DO ANYTHING, OR PREPARED TO DO ANYTHING TO END YOUR LIFE?: NO

## 2024-03-05 NOTE — H&P
"History Of Present Illness  Carolina Murrell is a 48 y.o. female presenting with history of atypical endometrial hyperplasia.  Patient here for a D&C with hysteroscopy and insertion of Mirena IUD     Past Medical History  She has a past medical history of Chronic bronchitis (CMS/HCC) and Hyperlipidemia.    Surgical History  She has a past surgical history that includes Other surgical history (2019); Other surgical history (2019); and Other surgical history (2019).     OB History  OB History    Para Term  AB Living   2 1     1 1   SAB IAB Ectopic Multiple Live Births   1 0   0 1      # Outcome Date GA Lbr Rocky/2nd Weight Sex Delivery Anes PTL Lv   2 SAB            1 Para                Sexual History  Social History     Substance and Sexual Activity   Sexual Activity Yes    Partners: Male    Birth control/protection: OCP        Social History  She reports that she has never smoked. She has never used smokeless tobacco. She reports current alcohol use. She reports that she does not use drugs.    Family History  Family History   Problem Relation Name Age of Onset    Hypothyroidism Mother      Breast cancer Neg Hx          Allergies  Penicillins and Sulfa (sulfonamide antibiotics)         Physical Exam  Constitutional:       Appearance: Normal appearance. She is well-developed.   Cardiovascular:      Rate and Rhythm: Normal rate and regular rhythm.   Pulmonary:      Effort: Pulmonary effort is normal.      Breath sounds: Normal breath sounds.   Abdominal:      Palpations: Abdomen is soft. There is no mass.      Tenderness: There is no abdominal tenderness.   Neurological:      Mental Status: She is alert.   Psychiatric:         Mood and Affect: Mood normal.         Behavior: Behavior normal.          Last Recorded Vitals  Blood pressure 134/88, pulse 79, temperature 36.3 °C (97.3 °F), temperature source Temporal, resp. rate 16, height 1.676 m (5' 6\"), weight (!) 171 kg (377 lb), last " "menstrual period 02/05/2024, SpO2 97 %.    Relevant Results  Medications    Current Facility-Administered Medications:     ipratropium-albuteroL (Duo-Neb) 0.5-2.5 mg/3 mL nebulizer solution 3 mL, 3 mL, nebulization, Once, Adam Hanson DO    lactated Ringer's infusion, 50 mL/hr, intravenous, Continuous, Adam Hanson DO, Last Rate: 50 mL/hr at 03/05/24 0802, 50 mL/hr at 03/05/24 0802    oxygen (O2) therapy, , inhalation, Continuous PRN - O2/gases, Adam Hanson DO, Start at 03/05/24 0700    Labs  CBC  No results found for: \"WBC\", \"NRBC\", \"RBC\", \"HGB\", \"HCT\", \"MCV\", \"MCH\", \"MCHC\", \"RDW\", \"PLT\", \"MPV\"    CMP  No results found for: \"GLUCOSE\", \"NA\", \"K\", \"CL\", \"CO2\", \"ANIONGAP\", \"BUN\", \"CREATININE\", \"EGFR\", \"CALCIUM\", \"ALBUMIN\", \"ALKPHOS\", \"PROT\", \"AST\", \"BILITOT\", \"ALT\"    Coagulation   No results found for: \"PROTIME\", \"INR\", \"APTT\", \"FIBRINOGEN\"    Pregnancy Tests  No results found for: \"HCGQUANT\", \"HCGURINE\"    Imaging   No results found for this or any previous visit.         Assessment/Plan   Principal Problem:    Atypical endometrial hyperplasia      PLAN:  D&C with hysteroscopy and insertion of Mirena IUD         Mendoza Phelps MD    "

## 2024-03-05 NOTE — ANESTHESIA PREPROCEDURE EVALUATION
Patient: Carolina Murrell    Procedure Information       Date/Time: 03/05/24 0900    Procedures:       Dilatation and Curettage and hysteroscopy, Insertion Device Uterus - MIRENA IUD      .    Location: [facility] / [facility]    Surgeons: Mendoza Phelps MD            Relevant Problems   Anesthesia (within normal limits)      Cardiovascular   (+) Essential hypertension   (+) Hypercholesterolemia      Endocrine   (+) Class 3 severe obesity in adult (CMS/HCC)       Clinical information reviewed:   Tobacco  Allergies  Meds  Problems  Med Hx  Surg Hx   Fam Hx  Soc   Hx        NPO Detail:  NPO/Void Status  Date of Last Liquid: 03/04/24  Time of Last Liquid: 1930  Date of Last Solid: 03/04/24  Time of Last Solid: 1930         PHYSICAL EXAM    Anesthesia Plan    History of general anesthesia?: yes  History of complications of general anesthesia?: no    ASA 3     general     The patient is not a current smoker.    intravenous induction   Trial extubation is planned.  Anesthetic plan and risks discussed with patient.    Plan discussed with attending.

## 2024-03-05 NOTE — ANESTHESIA PROCEDURE NOTES
Airway  Date/Time: 3/5/2024 8:57 AM  Urgency: elective    Airway not difficult    Staffing  Performed: CRNA   Authorized by: CHANTEL Santoyo    Performed by: CHANTEL Santoyo  Patient location during procedure: OR    Indications and Patient Condition  Indications for airway management: anesthesia  Spontaneous ventilation: present  Sedation level: deep  Preoxygenated: yes  Patient position: sniffing  Mask difficulty assessment: 1 - vent by mask  Planned trial extubation    Final Airway Details  Final airway type: endotracheal airway      Successful airway: ETT  Cuffed: yes   Successful intubation technique: video laryngoscopy  Facilitating devices/methods: intubating stylet  Endotracheal tube insertion site: oral  Blade: Jairo  Blade size: #3  ETT size (mm): 7.5  Cormack-Lehane Classification: grade I - full view of glottis  Placement verified by: chest auscultation and capnometry   Measured from: lips  Number of attempts at approach: 1    Additional Comments  Atraumatic, dentition and lips intact.

## 2024-03-05 NOTE — ANESTHESIA POSTPROCEDURE EVALUATION
Patient: Carolina Murrell    Procedure Summary       Date: 03/05/24 Room / Location: [facility] / [facility]    Anesthesia Start: 0850 Anesthesia Stop: 0933    Procedures:       Dilatation and Curettage and hysteroscopy, Insertion Device Uterus - MIRENA IUD      . Diagnosis:       Atypical endometrial hyperplasia      (Atypical endometrial hyperplasia [N85.02])    Surgeons: Mendoza Phelps MD Responsible Provider: CHANTEL Santoyo    Anesthesia Type: general ASA Status: 3            Anesthesia Type: general    Vitals Value Taken Time   /75 03/05/24 0951   Temp 36.7 °C (98 °F) 03/05/24 0932   Pulse 64 03/05/24 0956   Resp 15 03/05/24 0956   SpO2 99 % 03/05/24 0956   Vitals shown include unvalidated device data.    Anesthesia Post Evaluation    Patient location during evaluation: PACU  Patient participation: complete - patient participated  Level of consciousness: awake and alert  Pain score: 2  Pain management: satisfactory to patient  Airway patency: patent  Cardiovascular status: acceptable  Respiratory status: acceptable and room air  Hydration status: acceptable  Postoperative Nausea and Vomiting: none    There were no known notable events for this encounter.

## 2024-03-05 NOTE — OP NOTE
Dilatation and Curettage and hysteroscopy, Insertion Device Uterus - MIRENA IUD, . Operative Note     Date: 3/5/2024  OR Location: [facility]    Name: Carolina Murrell, : 1975, Age: 48 y.o., MRN: [id number], Sex: female    Diagnosis  Pre-op Diagnosis     * Atypical endometrial hyperplasia [N85.02] Post-op Diagnosis     * Atypical endometrial hyperplasia [N85.02]     Procedures  Dilatation, curettage, hysteroscopy, and insertion of Mirena IUD    Surgeons      * Mendoza Phelps - Primary    Resident/Fellow/Other Assistant:  Surgeon(s) and Role:    Procedure Summary  Anesthesia: General  ASA: III  Anesthesia Staff: CRNA: DARELL Santoyo-CRNA  Estimated Blood Loss: 5mL  Intra-op Medications:   Administrations occurring from 0900 to 1000 on 24:   Medication Name Total Dose   levonorgestrel (Mirena) 21 mcg/24 hours (8 yrs) 52 mg IUD 1 each              Anesthesia Record               Intraprocedure I/O Totals       None           Specimen:   ID Type Source Tests Collected by Time   1 : ----- Tissue ENDOMETRIUM CURETTINGS SURGICAL PATHOLOGY EXAM Mendoza Phelps MD 3/5/2024 0918        Staff:   Circulator: Eladia Combs RN  Scrub Person: Lexi Ashton         Drains and/or Catheters: * None in log *    Tourniquet Times:         Implants:  Implants       Type Name Action Serial No.      Implant SYSTEM, INTRAUTERINE MIRENA - [id number] - [id number] Implanted [id number]              Findings: The endometrial canal sounded to 8 cm, normal endometrium.    Indications: Carolina Murrell is an 48 y.o. female who is having surgery for Atypical endometrial hyperplasia [N85.02].     The patient was seen in the preoperative area. The risks, benefits, complications, treatment options, non-operative alternatives, expected recovery and outcomes were discussed with the patient. The possibilities of reaction to medication, pulmonary aspiration, injury to surrounding structures, bleeding, recurrent infection, the  need for additional procedures, failure to diagnose a condition, and creating a complication requiring transfusion or operation were discussed with the patient. The patient concurred with the proposed plan, giving informed consent.  The site of surgery was properly noted/marked if necessary per policy. The patient has been actively warmed in preoperative area. Preoperative antibiotics are not indicated. Venous thrombosis prophylaxis have been ordered including bilateral sequential compression devices    Procedure Details: The patient was brought to the operating room. A huddle was performed. The patient was placed under general anesthesia. She was placed in the lithotomy position and prepped and draped in the usual fashion for a vaginal procedure. A timeout was performed. A Weighted speculum was inserted in the vagina. The  cervix was grasped with a toothed tenaculum. This uterus was sounded to 8cm. and progressively dilated. A hysteroscopy was performed using saline as the distention media, patient with normal endometrial canal, both tubal ostia were visualized.  No lesions noted.  The hysteroscope was removed. Endometrial curettings were obtained.  The Mirena IUD was inserted and string cut.  All instruments were removed. Estimated blood loss was 5 ml. All counts were correct at the end of the procedure. The patient was taken to the recovery room in stable condition.    Complications:  None; patient tolerated the procedure well.    Disposition: PACU - hemodynamically stable.  Condition: stable                 Mendoza Phelps  Phone Number: [phone]

## 2024-03-06 ASSESSMENT — PAIN SCALES - GENERAL: PAINLEVEL_OUTOF10: 2

## 2024-03-11 ENCOUNTER — TELEPHONE (OUTPATIENT)
Dept: OBSTETRICS AND GYNECOLOGY | Facility: CLINIC | Age: 49
End: 2024-03-11
Payer: COMMERCIAL

## 2024-03-11 NOTE — TELEPHONE ENCOUNTER
----- Message from Trinidad Casillas RN sent at 3/11/2024 12:08 PM EDT -----  Pt had D&C hysteroscopy and IUD placement 3/5/24, has been doing well ever since, but now is having 4-5/10 cramping that does not go away with 600mg ibuprofen and moderate bleeding. Pt was on menses at time of procedure and has not had much bleeding since. Was constipate but this has resolved since. Pt just wants to make sure it is normal to have bleeding and cramping starting 6 days out from procedure. Will review with Dr Phelps this afternoon and notify pt. Pt agreeable

## 2024-04-08 ENCOUNTER — APPOINTMENT (OUTPATIENT)
Dept: OBSTETRICS AND GYNECOLOGY | Facility: CLINIC | Age: 49
End: 2024-04-08
Payer: COMMERCIAL

## 2024-04-10 ENCOUNTER — OFFICE VISIT (OUTPATIENT)
Dept: OBSTETRICS AND GYNECOLOGY | Facility: CLINIC | Age: 49
End: 2024-04-10
Payer: COMMERCIAL

## 2024-04-10 VITALS
BODY MASS INDEX: 47.09 KG/M2 | DIASTOLIC BLOOD PRESSURE: 92 MMHG | HEIGHT: 66 IN | WEIGHT: 293 LBS | SYSTOLIC BLOOD PRESSURE: 130 MMHG

## 2024-04-10 DIAGNOSIS — Z12.4 SCREENING FOR CERVICAL CANCER: ICD-10-CM

## 2024-04-10 DIAGNOSIS — N85.02 ATYPICAL ENDOMETRIAL HYPERPLASIA: Primary | ICD-10-CM

## 2024-04-10 PROCEDURE — 1036F TOBACCO NON-USER: CPT | Performed by: OBSTETRICS & GYNECOLOGY

## 2024-04-10 PROCEDURE — 88175 CYTOPATH C/V AUTO FLUID REDO: CPT

## 2024-04-10 PROCEDURE — 3008F BODY MASS INDEX DOCD: CPT | Performed by: OBSTETRICS & GYNECOLOGY

## 2024-04-10 PROCEDURE — 3080F DIAST BP >= 90 MM HG: CPT | Performed by: OBSTETRICS & GYNECOLOGY

## 2024-04-10 PROCEDURE — 99213 OFFICE O/P EST LOW 20 MIN: CPT | Performed by: OBSTETRICS & GYNECOLOGY

## 2024-04-10 PROCEDURE — 3075F SYST BP GE 130 - 139MM HG: CPT | Performed by: OBSTETRICS & GYNECOLOGY

## 2024-04-10 NOTE — PROGRESS NOTES
Subjective   Patient ID: Carolina Murrell is a 48 y.o. female who presents for No chief complaint on file..  HPI  48-year-old here for follow-up status post D&C with hysteroscopy and insertion of Mirena IUD for history of atypical endometrial hyperplasia.  Patient with mild cramping and light bleeding since surgery.  Otherwise tolerated well.      Objective   Physical Exam  Exam conducted with a chaperone present.   Constitutional:       Appearance: Normal appearance. She is obese.   Abdominal:      Palpations: Abdomen is soft. There is no mass.      Tenderness: There is no abdominal tenderness.   Genitourinary:     General: Normal vulva.      Vagina: Normal. No lesions.      Cervix: No lesion.      Uterus: Normal. Not tender.       Adnexa: Right adnexa normal and left adnexa normal.        Right: No mass or tenderness.          Left: No mass or tenderness.        Comments: IUD string present  Neurological:      Mental Status: She is alert.   Psychiatric:         Mood and Affect: Mood normal.         Behavior: Behavior normal.         Assessment/Plan   Problem List Items Addressed This Visit             ICD-10-CM    Atypical endometrial hyperplasia - Primary N85.02     --POSTOP: Patient with D&C with hysteroscopy and insertion of Mirena IUD on March 5, 2024, pathology was normal  --ATYPICAL ENDOMETRIAL HYPERPLASIA: Patient with history of AUB.  Biopsy in January 2024 revealed atypical endometrial hyperplasia.  Patient started on Aygestin.  Patient with D&C with hysteroscopy and insertion of Mirena IUD performed 3/5/2024 with pathology revealing no evidence of hyperplasia.  Discussed with patient continued monitoring with endometrial biopsy in 3 to 6 months.  Patient still concerned for potential progression to endometrial cancer and considering hysterectomy.  Patient desires to have repeat biopsy in 3 months.  -- AUB: Patient with Mirena IUD inserted 3/5/2024, IUD string present  --Patient with normal Pap and HPV  November 2021, repeat Pap January 2024 was inadequate with negative HPV.  Pap repeated today.  --Normal mammogram February 2023 and scheduled for repeat  --Patient is P1    PLAN:  Pap repeated today (no HPV, had normal HPV January 2024)  Follow-up in 3 months for repeat endometrial biopsy          Other Visit Diagnoses         Codes    Screening for cervical cancer     Z12.4    Relevant Orders    THINPREP PAP

## 2024-04-10 NOTE — ASSESSMENT & PLAN NOTE
--POSTOP: Patient with D&C with hysteroscopy and insertion of Mirena IUD on March 5, 2024, pathology was normal  --ATYPICAL ENDOMETRIAL HYPERPLASIA: Patient with history of AUB.  Biopsy in January 2024 revealed atypical endometrial hyperplasia.  Patient started on Aygestin.  Patient with D&C with hysteroscopy and insertion of Mirena IUD performed 3/5/2024 with pathology revealing no evidence of hyperplasia.  Discussed with patient continued monitoring with endometrial biopsy in 3 to 6 months.  Patient still concerned for potential progression to endometrial cancer and considering hysterectomy.  Patient desires to have repeat biopsy in 3 months.  -- AUB: Patient with Mirena IUD inserted 3/5/2024, IUD string present  --Patient with normal Pap and HPV November 2021, repeat Pap January 2024 was inadequate with negative HPV.  Pap repeated today.  --Normal mammogram February 2023 and scheduled for repeat  --Patient is P1    PLAN:  Pap repeated today (no HPV, had normal HPV January 2024)  Follow-up in 3 months for repeat endometrial biopsy

## 2024-04-22 LAB
CYTOLOGY CMNT CVX/VAG CYTO-IMP: NORMAL
LAB AP HPV HR: NORMAL
LAB AP PREVIOUS ABNORMAL HISTORY: NORMAL
LABORATORY COMMENT REPORT: NORMAL
PATH REPORT.RELEVANT HX SPEC: NORMAL
PATH REPORT.TOTAL CANCER: NORMAL

## 2024-07-09 PROBLEM — Z97.5 IUD (INTRAUTERINE DEVICE) IN PLACE: Status: ACTIVE | Noted: 2024-07-09

## 2024-07-09 NOTE — PROGRESS NOTES
Patient ID: Carolina Murrell is a 48 y.o. female.    Endometrial biopsy    Date/Time: 7/10/2024 8:31 AM    Performed by: Rachel Monreal MD  Authorized by: Rachel Monreal MD    Consent:     Consent obtained: written    Consent given by: patient    Risks discussed: bleeding, infection and pain    Alternatives discussed: observation, referral and alternative treatment    Patient agrees, verbalizes understanding, and wants to proceed: yes    Pre-procedure:     Premeds: acetaminophen    Procedure:     A bimanual exam was performed: yes      Uterus size: 6-8 weeks    Uterus position: anteverted    Prepped with: Betadine    Tenaculum used: yes      A local block was performed: no      Local anesthetic: none    Cervix dilated: no    Findings:     Cervix: normal      Specimen collected: specimen collected and sent to pathology      Patient tolerance: tolerated well, no immediate complications  Subjective   Patient ID: Carolina Murrell is a 48 y.o. female who presents for Endo Biopsy .  She is a prior patient of Dr. Phelps.   2/12/2024 endometrial biopsy returned with atypical endometrial hyperplasia. This was performed during evaluation of dysfunctional uterine bleeding.     ULTRASOUND (1/12/2024): Uterus 8.3 x 5.1 x 4.7 cm.  Endometrial thickness 11 mm.  Ovaries not well-visualized  LABS (1/17/2024): FSH 6.2, estradiol 32, Hgb 14.7  D&C, hysteroscopy with benign pathology, and insertion of IUD performed March 5, 2024 with ALR.    Plan is for endometrial biopsy at 3-6 months postop to confirm resolution of atypical endometrial hyperplasia. She presents today for follow up.   She had spotting only for the first month. She had her first menses on 7/1/2024 with bleeding lasting 12 hours. One week later she passed a few small clots.   She can have intermittent cramping and pressure. This lasts a few hours and is mild. It is improving with time since IUD insertion.       She is very concerned regarding future  cancer risk. She desires definitive treatment with hysterectomy. WE reviewed increased risks associated with hysterectomy given her BMI of 61, along with challenges of performing hysterectomy itself. Will plan referral to advanced surgeon.           Review of Systems   Constitutional:  Negative for activity change.   HENT:  Negative for congestion.    Respiratory:  Negative for apnea and cough.    Cardiovascular:  Negative for chest pain.   Gastrointestinal:  Negative for constipation and diarrhea.   Genitourinary:  Negative for hematuria and vaginal pain.   Musculoskeletal:  Negative for joint swelling.   Neurological:  Negative for dizziness.   Psychiatric/Behavioral:  Negative for agitation.        Past Medical History:   Diagnosis Date    Chronic bronchitis (Multi)     Hyperlipidemia       Past Surgical History:   Procedure Laterality Date    DILATION AND CURETTAGE OF UTERUS      HYSTEROSCOPY      OTHER SURGICAL HISTORY  11/20/2019    Tubal ligation bilateral    OTHER SURGICAL HISTORY  11/20/2019    Ankle fracture repair    OTHER SURGICAL HISTORY  11/20/2019    Nasal septoplasty      Allergies   Allergen Reactions    Penicillins Anaphylaxis, Itching and Swelling    Sulfa (Sulfonamide Antibiotics) Fever, Hives, Nausea Only and Other      Current Outpatient Medications on File Prior to Visit   Medication Sig Dispense Refill    albuterol 90 mcg/actuation inhaler 2 puffs q 4-6 hours as needed 54 g 1    azelastine (Astelin) 137 mcg (0.1 %) nasal spray Administer into affected nostril(s) twice a day.      b complex 0.4 mg tablet Take 1 tablet by mouth once daily.      Capmist DM 60- mg tablet TAKE 1 TABLET BY MOUTH EVERY 6 HOURS FOR 5 DAYS AS NEEDED      cholecalciferol (Vitamin D3) 1,250 mcg (50,000 unit) tablet Take by mouth.      clobetasol (Temovate) 0.05 % ointment Clobetasol Propionate 0.05 % External Ointment   Quantity: 60  Refills: 0        Start : 17-Nov-2021   Active      fluticasone (Flonase) 50  mcg/actuation nasal spray Administer 2 sprays into each nostril once daily. 144 g 1    gabapentin (Neurontin) 100 mg capsule Take 1 capsule (100 mg) by mouth once daily at bedtime. 90 capsule 1    ibuprofen 600 mg tablet Take 1 tablet (600 mg) by mouth 3 times a day as needed.      ipratropium-albuteroL (Duo-Neb) 0.5-2.5 mg/3 mL nebulizer solution       magnesium 200 mg tablet Take by mouth.      mometasone (Elocon) 0.1 % cream APPLY THIN LAYER TOPICALLY TO THE AFFECTED AREA EVERY DAY AS NEEDED FOR ITCHING      montelukast (Singulair) 10 mg tablet Take 1 tablet (10 mg) by mouth once daily. 90 tablet 1    rOPINIRole (Requip) 2 mg tablet Take 1 tablet (2 mg) by mouth once daily at bedtime. 90 tablet 1    Symbicort 160-4.5 mcg/actuation inhaler Inhale 2 puffs 2 times a day. Rinse mouth after use      VITAMIN B COMPLEX ORAL Take by mouth once daily.       No current facility-administered medications on file prior to visit.        Objective   Physical Exam  Constitutional:       Appearance: Normal appearance. She is obese.   Cardiovascular:      Rate and Rhythm: Normal rate and regular rhythm.   Pulmonary:      Effort: Pulmonary effort is normal.      Breath sounds: Normal breath sounds.   Abdominal:      Palpations: Abdomen is soft. There is no mass.      Tenderness: There is no abdominal tenderness.      Hernia: No hernia is present.   Genitourinary:     General: Normal vulva.      Exam position: Lithotomy position.      Labia:         Right: No lesion.         Left: No lesion.       Urethra: No urethral lesion.      Vagina: Normal. No lesions or prolapsed vaginal walls.      Cervix: No lesion.      Uterus: Normal. Not enlarged, not tender and no uterine prolapse.       Adnexa: Right adnexa normal and left adnexa normal.        Right: No mass or tenderness.          Left: No mass or tenderness.        Comments: IUD strings are visualized.  Long [place] speculum is used without difficulty visualizing cervix.   Skin:      General: Skin is warm and dry.   Neurological:      Mental Status: She is alert.           Problem List Items Addressed This Visit       IUD (intrauterine device) in place    Overview     Mirena IUD was inserted on 3/5/2024 at hysteroscopy dilation and curettage. Pathology returned benign but prior biopsy revealed atypical endometrial hyperplasia.         Class 3 severe obesity in adult (Multi)    Relevant Orders    Referral to Gynecology    Atypical endometrial hyperplasia - Primary    Overview     2/12/2024 endometrial biopsy with atypical endometrial hyperplasia;   D&C, hysteroscopy with benign pathology, and insertion of IUD performed March 5, 2024 with ALR.  ULTRASOUND (1/12/2024): Uterus 8.3 x 5.1 x 4.7 cm.  Endometrial thickness 11 mm.  Ovaries not well-visualized  LABS (1/17/2024): FSH 6.2, estradiol 32, Hgb 14.7     Follow up endometrial biopsy is performed on 7/10/2024.   She desires definitive treatment with hysterectomy. Referral is placed to Dr. Davis for surgery planning.          Relevant Orders    Referral to Gynecology

## 2024-07-10 ENCOUNTER — APPOINTMENT (OUTPATIENT)
Dept: OBSTETRICS AND GYNECOLOGY | Facility: CLINIC | Age: 49
End: 2024-07-10
Payer: COMMERCIAL

## 2024-07-10 VITALS — WEIGHT: 293 LBS | SYSTOLIC BLOOD PRESSURE: 124 MMHG | DIASTOLIC BLOOD PRESSURE: 82 MMHG | BODY MASS INDEX: 61.33 KG/M2

## 2024-07-10 DIAGNOSIS — E66.01 CLASS 3 SEVERE OBESITY DUE TO EXCESS CALORIES WITH SERIOUS COMORBIDITY IN ADULT, UNSPECIFIED BMI (MULTI): ICD-10-CM

## 2024-07-10 DIAGNOSIS — Z97.5 IUD (INTRAUTERINE DEVICE) IN PLACE: ICD-10-CM

## 2024-07-10 DIAGNOSIS — N85.02 ATYPICAL ENDOMETRIAL HYPERPLASIA: Primary | ICD-10-CM

## 2024-07-10 PROCEDURE — 99213 OFFICE O/P EST LOW 20 MIN: CPT | Performed by: OBSTETRICS & GYNECOLOGY

## 2024-07-10 PROCEDURE — 58100 BIOPSY OF UTERUS LINING: CPT | Performed by: OBSTETRICS & GYNECOLOGY

## 2024-07-10 ASSESSMENT — ENCOUNTER SYMPTOMS
DIARRHEA: 0
AGITATION: 0
ACTIVITY CHANGE: 0
APNEA: 0
DIZZINESS: 0
CONSTIPATION: 0
HEMATURIA: 0
COUGH: 0
JOINT SWELLING: 0

## 2024-07-30 ENCOUNTER — APPOINTMENT (OUTPATIENT)
Dept: PRIMARY CARE | Facility: CLINIC | Age: 49
End: 2024-07-30
Payer: COMMERCIAL

## 2024-07-30 VITALS
BODY MASS INDEX: 61.82 KG/M2 | OXYGEN SATURATION: 97 % | TEMPERATURE: 97.6 F | WEIGHT: 293 LBS | SYSTOLIC BLOOD PRESSURE: 130 MMHG | HEART RATE: 69 BPM | DIASTOLIC BLOOD PRESSURE: 82 MMHG

## 2024-07-30 DIAGNOSIS — E66.01 CLASS 3 SEVERE OBESITY DUE TO EXCESS CALORIES WITH SERIOUS COMORBIDITY AND BODY MASS INDEX (BMI) OF 60.0 TO 69.9 IN ADULT (MULTI): ICD-10-CM

## 2024-07-30 DIAGNOSIS — G25.81 RESTLESS LEG SYNDROME: Primary | ICD-10-CM

## 2024-07-30 DIAGNOSIS — J30.1 ALLERGIC RHINITIS DUE TO POLLEN, UNSPECIFIED SEASONALITY: ICD-10-CM

## 2024-07-30 PROCEDURE — 3079F DIAST BP 80-89 MM HG: CPT | Performed by: INTERNAL MEDICINE

## 2024-07-30 PROCEDURE — 99214 OFFICE O/P EST MOD 30 MIN: CPT | Performed by: INTERNAL MEDICINE

## 2024-07-30 PROCEDURE — 3075F SYST BP GE 130 - 139MM HG: CPT | Performed by: INTERNAL MEDICINE

## 2024-07-30 PROCEDURE — G2211 COMPLEX E/M VISIT ADD ON: HCPCS | Performed by: INTERNAL MEDICINE

## 2024-07-30 RX ORDER — MONTELUKAST SODIUM 10 MG/1
10 TABLET ORAL DAILY
Qty: 90 TABLET | Refills: 1 | Status: SHIPPED | OUTPATIENT
Start: 2024-07-30

## 2024-07-30 RX ORDER — ROPINIROLE 2 MG/1
2 TABLET, FILM COATED ORAL NIGHTLY
Qty: 90 TABLET | Refills: 1 | Status: SHIPPED | OUTPATIENT
Start: 2024-07-30

## 2024-07-30 RX ORDER — FLUTICASONE PROPIONATE 50 MCG
2 SPRAY, SUSPENSION (ML) NASAL DAILY
Qty: 144 G | Refills: 1 | Status: SHIPPED | OUTPATIENT
Start: 2024-07-30

## 2024-07-30 RX ORDER — GABAPENTIN 100 MG/1
100 CAPSULE ORAL NIGHTLY
Qty: 90 CAPSULE | Refills: 1 | Status: SHIPPED | OUTPATIENT
Start: 2024-07-30

## 2024-07-30 RX ORDER — ALBUTEROL SULFATE 90 UG/1
AEROSOL, METERED RESPIRATORY (INHALATION)
Qty: 54 G | Refills: 1 | Status: SHIPPED | OUTPATIENT
Start: 2024-07-30

## 2024-07-30 ASSESSMENT — ENCOUNTER SYMPTOMS
ALLERGIC/IMMUNOLOGIC NEGATIVE: 1
PSYCHIATRIC NEGATIVE: 1
CARDIOVASCULAR NEGATIVE: 1
EYES NEGATIVE: 1
HEMATOLOGIC/LYMPHATIC NEGATIVE: 1
CONSTITUTIONAL NEGATIVE: 1
MUSCULOSKELETAL NEGATIVE: 1
RESPIRATORY NEGATIVE: 1
NEUROLOGICAL NEGATIVE: 1
ENDOCRINE NEGATIVE: 1
GASTROINTESTINAL NEGATIVE: 1

## 2024-07-30 NOTE — PROGRESS NOTES
Subjective   Patient ID: Carolina Murrell is a 48 y.o. female who presents for 6 month follow up .  Patient presents today in follow up re:   allergic rhinitis and restless legs.    Rhinitis Sx have been overall stable on current medical therapy.  No adverse effects of therapy.    Restless legs have not been as well controlled with therapy of late.  She has tried taking a second ropinirole with modestly good effect.  No adverse effects of therapy reported.        Review of Systems   Constitutional: Negative.    HENT: Negative.     Eyes: Negative.    Respiratory: Negative.     Cardiovascular: Negative.    Gastrointestinal: Negative.    Endocrine: Negative.    Genitourinary: Negative.    Musculoskeletal: Negative.    Skin: Negative.    Allergic/Immunologic: Negative.    Neurological: Negative.    Hematological: Negative.    Psychiatric/Behavioral: Negative.         Objective     Blood pressure 130/82, pulse 69, temperature 36.4 °C (97.6 °F), temperature source Temporal, weight (!) 174 kg (383 lb), SpO2 97%.   Physical Exam  Vitals reviewed.   Constitutional:       Appearance: Normal appearance. She is obese.   Neck:      Vascular: No carotid bruit.   Cardiovascular:      Rate and Rhythm: Normal rate and regular rhythm.      Pulses: Normal pulses.      Heart sounds: Normal heart sounds. No murmur heard.  Pulmonary:      Effort: Pulmonary effort is normal.      Breath sounds: Normal breath sounds. No wheezing or rales.   Abdominal:      General: Abdomen is flat. There is no distension.      Palpations: Abdomen is soft.      Tenderness: There is no abdominal tenderness.   Musculoskeletal:         General: Normal range of motion.      Cervical back: Normal range of motion and neck supple.   Skin:     General: Skin is warm and dry.   Neurological:      General: No focal deficit present.      Mental Status: She is alert and oriented to person, place, and time.   Psychiatric:         Mood and Affect: Mood normal.          Behavior: Behavior normal.         Assessment/Plan   Problem List Items Addressed This Visit       Allergic rhinitis    Relevant Medications    montelukast (Singulair) 10 mg tablet    fluticasone (Flonase) 50 mcg/actuation nasal spray    albuterol 90 mcg/actuation inhaler    Restless leg syndrome - Primary    Relevant Medications    rOPINIRole (Requip) 2 mg tablet    gabapentin (Neurontin) 100 mg capsule    Class 3 severe obesity in adult (Multi)    Relevant Orders    Referral to Bariatric Surgery     Rhinitis Sx well compensated on current therapy.  Will continue present therapy and follow.  Restless legs Sx overall well compensated on current therapy.  Will continue present therapy, and the addition of gabapentin has been helpful such that she does not need the gabapentin every night.  Will continue present therapy and follow.  Pt. advised on improving dietary choices and portion control as well as increasing exercise to promote weight loss.  She was referred to [facility] and she states that she made multiple phone calls without getting her call answered and she got no return call after leaving messages.  We tried to help get her access at last OV without success.  It is noted that she had lost 28# on her own over the prior 6 months and was encouraged.  Over the last 6 months, there has been no change in weight.  Will redo referral and she is given info for [facility].      Follow up in 6 months  Lab to be drawn 1 week prior to next office visit.

## 2024-07-31 ENCOUNTER — APPOINTMENT (OUTPATIENT)
Dept: PRIMARY CARE | Facility: CLINIC | Age: 49
End: 2024-07-31
Payer: COMMERCIAL

## 2024-08-01 ENCOUNTER — TELEPHONE (OUTPATIENT)
Dept: ORTHOPEDIC SURGERY | Facility: HOSPITAL | Age: 49
End: 2024-08-01
Payer: COMMERCIAL

## 2024-08-01 NOTE — TELEPHONE ENCOUNTER
PATIENT IS COMING IN FOR AN APPOINTMENT ON 8/7/24 IT SOUNDS LIKE SHE JUST NEEDS ANOTHER SCRIPT FOR ORTHOTICS LAST SEEN 11/2020, WOULD YOU LIKE TO SEE HER IN OFFICE OR JUST WRITE ANOTHER SCRIPT FOR ORTHOTICS?    PLEASE ADVISE

## 2024-08-06 ENCOUNTER — OFFICE VISIT (OUTPATIENT)
Dept: OBSTETRICS AND GYNECOLOGY | Facility: CLINIC | Age: 49
End: 2024-08-06
Payer: COMMERCIAL

## 2024-08-06 VITALS
HEART RATE: 69 BPM | DIASTOLIC BLOOD PRESSURE: 84 MMHG | HEIGHT: 66 IN | SYSTOLIC BLOOD PRESSURE: 138 MMHG | BODY MASS INDEX: 47.09 KG/M2 | WEIGHT: 293 LBS

## 2024-08-06 DIAGNOSIS — Z71.3 WEIGHT LOSS COUNSELING, ENCOUNTER FOR: ICD-10-CM

## 2024-08-06 DIAGNOSIS — N85.02 EIN (ENDOMETRIAL INTRAEPITHELIAL NEOPLASIA): Primary | ICD-10-CM

## 2024-08-06 PROCEDURE — 3079F DIAST BP 80-89 MM HG: CPT | Performed by: STUDENT IN AN ORGANIZED HEALTH CARE EDUCATION/TRAINING PROGRAM

## 2024-08-06 PROCEDURE — 99215 OFFICE O/P EST HI 40 MIN: CPT | Performed by: STUDENT IN AN ORGANIZED HEALTH CARE EDUCATION/TRAINING PROGRAM

## 2024-08-06 PROCEDURE — 3075F SYST BP GE 130 - 139MM HG: CPT | Performed by: STUDENT IN AN ORGANIZED HEALTH CARE EDUCATION/TRAINING PROGRAM

## 2024-08-06 PROCEDURE — 1036F TOBACCO NON-USER: CPT | Performed by: STUDENT IN AN ORGANIZED HEALTH CARE EDUCATION/TRAINING PROGRAM

## 2024-08-06 PROCEDURE — 3008F BODY MASS INDEX DOCD: CPT | Performed by: STUDENT IN AN ORGANIZED HEALTH CARE EDUCATION/TRAINING PROGRAM

## 2024-08-06 RX ORDER — GABAPENTIN 600 MG/1
600 TABLET ORAL ONCE
OUTPATIENT
Start: 2024-08-06 | End: 2024-08-06

## 2024-08-06 RX ORDER — CELECOXIB 400 MG/1
400 CAPSULE ORAL ONCE
OUTPATIENT
Start: 2024-08-06 | End: 2024-08-06

## 2024-08-06 RX ORDER — ACETAMINOPHEN 325 MG/1
975 TABLET ORAL ONCE
OUTPATIENT
Start: 2024-08-06 | End: 2024-08-06

## 2024-08-06 RX ORDER — HEPARIN SODIUM 5000 [USP'U]/ML
5000 INJECTION, SOLUTION INTRAVENOUS; SUBCUTANEOUS ONCE
OUTPATIENT
Start: 2024-08-06 | End: 2024-08-06

## 2024-08-06 RX ORDER — METRONIDAZOLE 500 MG/100ML
500 INJECTION, SOLUTION INTRAVENOUS ONCE
OUTPATIENT
Start: 2024-08-06 | End: 2024-08-06

## 2024-08-06 ASSESSMENT — PATIENT HEALTH QUESTIONNAIRE - PHQ9
1. LITTLE INTEREST OR PLEASURE IN DOING THINGS: NOT AT ALL
2. FEELING DOWN, DEPRESSED OR HOPELESS: NOT AT ALL
SUM OF ALL RESPONSES TO PHQ9 QUESTIONS 1 AND 2: 0

## 2024-08-06 ASSESSMENT — ENCOUNTER SYMPTOMS
ENDOCRINE NEGATIVE: 0
CONSTITUTIONAL NEGATIVE: 0
HEMATOLOGIC/LYMPHATIC NEGATIVE: 0
EYES NEGATIVE: 0
PSYCHIATRIC NEGATIVE: 0
MUSCULOSKELETAL NEGATIVE: 0
NEUROLOGICAL NEGATIVE: 0
CARDIOVASCULAR NEGATIVE: 0
ALLERGIC/IMMUNOLOGIC NEGATIVE: 0
GASTROINTESTINAL NEGATIVE: 0
RESPIRATORY NEGATIVE: 0

## 2024-08-06 ASSESSMENT — COLUMBIA-SUICIDE SEVERITY RATING SCALE - C-SSRS
1. IN THE PAST MONTH, HAVE YOU WISHED YOU WERE DEAD OR WISHED YOU COULD GO TO SLEEP AND NOT WAKE UP?: NO
6. HAVE YOU EVER DONE ANYTHING, STARTED TO DO ANYTHING, OR PREPARED TO DO ANYTHING TO END YOUR LIFE?: NO
2. HAVE YOU ACTUALLY HAD ANY THOUGHTS OF KILLING YOURSELF?: NO

## 2024-08-06 ASSESSMENT — PAIN SCALES - GENERAL: PAINLEVEL: 3

## 2024-08-06 NOTE — PROGRESS NOTES
"Division of Minimally Invasive Gynecologic Surgery  [facility]    24 Gynecology Consult     HISTORY OF PRESENT ILLNESS:  Carolina Murrell 49 y.o. perimenopausal P1( x1) referred by Dr. Phelps for atypical endometrial hyperplasia (EIN). Status post recent D+C 2024 w/ normal pathology and placement of Mirena IUD.     EIN was initially identified in 2024 on EMB and she was started on norethindrone. As above, D+C in May was benign. Repeat EMB was performed 2024 and showed no e/o persistent hyperplasia.     Imaging:   - Pelvic US 2024 showed uterus measuring 8cm x 5cm x 5cm. EMS 11mm, otherwise unremarkable.   Labs:   - Recent CMP, CBC WNL   - Recent TSH WNL   Screening:   - Last pap 2021 negative/negative, pap 2024 w/ insufficient cytology but negative HPV, repeat 2024 w/ negative cytology, no hx of CIN2-3  - Last mammogram 2024 BIRADS 1  PMHx: HLD, chronic bronchitis, restless leg syndrome (gabapentin)   PSHx: laparoscopic BTL, nasal surgery, ankle surgery, D+C     PAST MEDICAL HISTORY:  Past Medical History:   Diagnosis Date    Chronic bronchitis (Multi)     Hyperlipidemia     Restless leg syndrome      PAST SURGICAL HISTORY:  Past Surgical History:   Procedure Laterality Date    ANKLE FRACTURE SURGERY      DILATION AND CURETTAGE OF UTERUS      NOSE SURGERY      TUBAL LIGATION      laparoscopic     PHYSICAL EXAMINATION:  VITAL SIGNS: /84   Pulse 69   Ht 1.676 m (5' 6\")   Wt (!) 175 kg (385 lb)   LMP 2024   BMI 62.14 kg/m²      Constitutional:  No acute distress, well-nourished and well-developed  HEENT: EOM grossly intact, MMM, neck supple and with full ROM  Pulm:  Effort normal. No accessory muscle usage.  No respiratory distress.  Neurological:  She is alert and oriented to person place and time.  Skin: Warm, no pallor.  Psychiatric:  She has normal mood and affect.    ASSESSMENT:  Carolina Murrell 49 y.o. perimenopausal P1( x1) " referred by Dr. Phelps for atypical endometrial hyperplasia (EIN). Status post recent D+C 5/2024 w/ normal pathology and placement of Mirena IUD.   - We discussed prior finding of EIN on biopsy. We reviewed that with a diagnosis of EIN, there is a roughly 40% risk of undetected uterine cancer. We did discuss that I do think this risk is somewhat lower for her given multiple normal follow up biopsies, but it is still a risk. She is interested in hysterectomy for resolution of this risk, as well as for avoidance of need for continued endometrial sampling. This is reasonable.   - We discussed the standard procedure for robotic hysterectomy. We reviewed the risks/benefits/alternatives to surgery. She is aware of the risk of bleeding, infection, and damage to nearby structures, including bladder, ureters, bowel, and vasculature. She is agreeable to blood transfusion if recommended. We reviewed the small risk of laparotomy and the fact that fertility following hysterectomy is not an option.   - We also discussed the possibility that she may not tolerate Trendelenburg positioning/gas insufflation from a respiratory perspective. Should this occur, I would recommend deferring surgery until she can receive a Vnotes procedure later this year. As she has no active diagnosis of cancer and multiple negative biopsies, combined with the fact that she is receiving adequate progesterone suppression, delaying surgery by a few months to avoid open procedure (and the complexities of recovery from this in her case) is reasonable. She is open to this option.     PLAN:  - Total robotic hysterectomy, bilateral salpingectomy, any indicated procedure. PAT: Yes. Main only.   - Carolina and I are in agreement regarding delaying surgery for a Vnotes approach in the event that she cannot tolerate Trendelenburg/insufflation.   - Continue progesterone suppression in the interim   - She is working on weight loss and has had some success on her  own. We discussed option of nutrition referral and aquatic therapy to further help with this and she is interested in this. Referrals placed.     Nakita Davis MD  08/06/24  10:04 AM

## 2024-08-07 ENCOUNTER — APPOINTMENT (OUTPATIENT)
Dept: ORTHOPEDIC SURGERY | Facility: HOSPITAL | Age: 49
End: 2024-08-07
Payer: COMMERCIAL

## 2024-08-09 PROBLEM — N85.02 EIN (ENDOMETRIAL INTRAEPITHELIAL NEOPLASIA): Status: ACTIVE | Noted: 2024-08-06

## 2024-08-12 DIAGNOSIS — Z01.818 PREOP EXAMINATION: Primary | ICD-10-CM

## 2024-08-13 ENCOUNTER — OFFICE VISIT (OUTPATIENT)
Dept: PRIMARY CARE | Facility: CLINIC | Age: 49
End: 2024-08-13
Payer: COMMERCIAL

## 2024-08-13 VITALS
TEMPERATURE: 96.9 F | BODY MASS INDEX: 62.3 KG/M2 | WEIGHT: 293 LBS | SYSTOLIC BLOOD PRESSURE: 124 MMHG | DIASTOLIC BLOOD PRESSURE: 78 MMHG | OXYGEN SATURATION: 97 % | HEART RATE: 72 BPM

## 2024-08-13 DIAGNOSIS — H60.332 ACUTE SWIMMER'S EAR OF LEFT SIDE: Primary | ICD-10-CM

## 2024-08-13 DIAGNOSIS — Z12.11 ENCOUNTER FOR SCREENING FOR MALIGNANT NEOPLASM OF COLON: ICD-10-CM

## 2024-08-13 DIAGNOSIS — E66.01 CLASS 3 SEVERE OBESITY DUE TO EXCESS CALORIES WITH SERIOUS COMORBIDITY AND BODY MASS INDEX (BMI) OF 60.0 TO 69.9 IN ADULT (MULTI): ICD-10-CM

## 2024-08-13 PROCEDURE — 99213 OFFICE O/P EST LOW 20 MIN: CPT | Performed by: INTERNAL MEDICINE

## 2024-08-13 PROCEDURE — 3074F SYST BP LT 130 MM HG: CPT | Performed by: INTERNAL MEDICINE

## 2024-08-13 PROCEDURE — 3078F DIAST BP <80 MM HG: CPT | Performed by: INTERNAL MEDICINE

## 2024-08-13 RX ORDER — NEOMYCIN SULFATE, POLYMYXIN B SULFATE, HYDROCORTISONE 3.5; 10000; 1 MG/ML; [USP'U]/ML; MG/ML
2 SOLUTION/ DROPS AURICULAR (OTIC) 4 TIMES DAILY
Qty: 10 ML | Refills: 0 | Status: SHIPPED | OUTPATIENT
Start: 2024-08-13 | End: 2024-08-20

## 2024-08-13 ASSESSMENT — ENCOUNTER SYMPTOMS
HEMATOLOGIC/LYMPHATIC NEGATIVE: 1
ALLERGIC/IMMUNOLOGIC NEGATIVE: 1
EYES NEGATIVE: 1
ENDOCRINE NEGATIVE: 1
CARDIOVASCULAR NEGATIVE: 1
CONSTITUTIONAL NEGATIVE: 1
SINUS PRESSURE: 1
NEUROLOGICAL NEGATIVE: 1
MUSCULOSKELETAL NEGATIVE: 1
GASTROINTESTINAL NEGATIVE: 1
RESPIRATORY NEGATIVE: 1
PSYCHIATRIC NEGATIVE: 1

## 2024-08-13 NOTE — PROGRESS NOTES
Subjective   Patient ID: Carolina Murrell is a 49 y.o. female who presents for water in the ear.  Patient presents today with a complaint of sensation of water in her ear.    She states that she is having discharge from the left ear that started 4 days ago.  She subsequently states that she began to notice pressure on the left cheek and pressure on the upper teeth bilaterally as well.  She denies fever or chills.        Review of Systems   Constitutional: Negative.    HENT:  Positive for ear discharge, ear pain and sinus pressure.    Eyes: Negative.    Respiratory: Negative.     Cardiovascular: Negative.    Gastrointestinal: Negative.    Endocrine: Negative.    Genitourinary: Negative.    Musculoskeletal: Negative.    Skin: Negative.    Allergic/Immunologic: Negative.    Neurological: Negative.    Hematological: Negative.    Psychiatric/Behavioral: Negative.         Objective     Blood pressure 124/78, pulse 72, temperature 36.1 °C (96.9 °F), temperature source Temporal, weight (!) 175 kg (386 lb), last menstrual period 06/04/2024, SpO2 97%.   Physical Exam  Constitutional:       Appearance: Normal appearance. She is obese.   HENT:      Head: Normocephalic and atraumatic.      Right Ear: Tympanic membrane and ear canal normal.      Ears:      Comments: Left ear canal remarkable for edema and yellow-green exudate.  No blood is seen.     Nose: Nose normal.      Mouth/Throat:      Mouth: Mucous membranes are moist.      Pharynx: Oropharynx is clear.   Pulmonary:      Effort: Pulmonary effort is normal.      Breath sounds: Normal breath sounds.   Musculoskeletal:      Cervical back: Normal range of motion and neck supple.   Lymphadenopathy:      Cervical: No cervical adenopathy.   Neurological:      Mental Status: She is alert.   Psychiatric:         Mood and Affect: Mood normal.         Behavior: Behavior normal.         Thought Content: Thought content normal.         Assessment/Plan   Problem List Items Addressed  This Visit       Class 3 severe obesity in adult (Multi)     Other Visit Diagnoses       Acute swimmer's ear of left side    -  Primary    Relevant Medications    neomycin-polymyxin-HC (Cortisporin) otic solution    Encounter for screening for malignant neoplasm of colon        Relevant Orders    Referral to General Surgery          Exam compatible with otitis externa only.  Will treat with Cortisporin Otic.  She has not yet had screening colonoscopy.  She is counselled and referral is entered.  In re:  weight loss, she states she contacted [facility] and they wanted to put her through a lot of unnecessary testing and did not offer nutrition counselling as initial therapy.  She was able to get a referral to Nutrition from her Gyn surgeon which she intends to pursue.    Follow up as scheduled

## 2024-08-19 ENCOUNTER — TELEPHONE (OUTPATIENT)
Dept: PRIMARY CARE | Facility: CLINIC | Age: 49
End: 2024-08-19
Payer: COMMERCIAL

## 2024-08-19 DIAGNOSIS — E66.01 CLASS 3 SEVERE OBESITY DUE TO EXCESS CALORIES WITH SERIOUS COMORBIDITY AND BODY MASS INDEX (BMI) OF 60.0 TO 69.9 IN ADULT (MULTI): ICD-10-CM

## 2024-08-19 DIAGNOSIS — Z12.11 ENCOUNTER FOR SCREENING FOR MALIGNANT NEOPLASM OF COLON: Primary | ICD-10-CM

## 2024-08-19 NOTE — TELEPHONE ENCOUNTER
[facility] called to let you know that tonya has to get her colonoscopy done at [facility] due to Latta BMI cut off at 60.    denies

## 2024-08-19 NOTE — TELEPHONE ENCOUNTER
Patient reported she is still experiencing symptoms of an ear infection. She said she has pressure in both ears and has started to have mild jaw pain. She is on the last day of her antibiotics. Patient is unsure how to further combat this, please advise.

## 2024-08-21 ENCOUNTER — TELEPHONE (OUTPATIENT)
Dept: PRIMARY CARE | Facility: CLINIC | Age: 49
End: 2024-08-21
Payer: COMMERCIAL

## 2024-08-21 NOTE — TELEPHONE ENCOUNTER
Patient called and reported that even after continuing the ear drops, she is still experiencing symptoms. Patient said that her ears are still plugged, there is a mild pain in both sides of her jaw and she is starting to feel some sinus pressure. Patient is unsure how to further combat this, please advise.

## 2024-08-23 ENCOUNTER — OFFICE VISIT (OUTPATIENT)
Dept: PRIMARY CARE | Facility: CLINIC | Age: 49
End: 2024-08-23
Payer: COMMERCIAL

## 2024-08-23 VITALS
OXYGEN SATURATION: 97 % | HEART RATE: 68 BPM | WEIGHT: 293 LBS | HEIGHT: 66 IN | SYSTOLIC BLOOD PRESSURE: 115 MMHG | TEMPERATURE: 97.4 F | DIASTOLIC BLOOD PRESSURE: 82 MMHG | BODY MASS INDEX: 47.09 KG/M2 | RESPIRATION RATE: 16 BRPM

## 2024-08-23 DIAGNOSIS — H66.002 NON-RECURRENT ACUTE SUPPURATIVE OTITIS MEDIA OF LEFT EAR WITHOUT SPONTANEOUS RUPTURE OF TYMPANIC MEMBRANE: ICD-10-CM

## 2024-08-23 DIAGNOSIS — H65.112: Primary | ICD-10-CM

## 2024-08-23 DIAGNOSIS — J01.10 ACUTE NON-RECURRENT FRONTAL SINUSITIS: ICD-10-CM

## 2024-08-23 PROCEDURE — 3079F DIAST BP 80-89 MM HG: CPT | Performed by: NURSE PRACTITIONER

## 2024-08-23 PROCEDURE — 3008F BODY MASS INDEX DOCD: CPT | Performed by: NURSE PRACTITIONER

## 2024-08-23 PROCEDURE — 1036F TOBACCO NON-USER: CPT | Performed by: NURSE PRACTITIONER

## 2024-08-23 PROCEDURE — 99213 OFFICE O/P EST LOW 20 MIN: CPT | Performed by: NURSE PRACTITIONER

## 2024-08-23 PROCEDURE — 3074F SYST BP LT 130 MM HG: CPT | Performed by: NURSE PRACTITIONER

## 2024-08-23 RX ORDER — METHYLPREDNISOLONE 4 MG/1
TABLET ORAL
Qty: 21 TABLET | Refills: 0 | Status: SHIPPED | OUTPATIENT
Start: 2024-08-23 | End: 2024-08-30

## 2024-08-23 RX ORDER — DOXYCYCLINE 100 MG/1
100 CAPSULE ORAL 2 TIMES DAILY
Qty: 28 CAPSULE | Refills: 0 | Status: SHIPPED | OUTPATIENT
Start: 2024-08-23 | End: 2024-09-06

## 2024-08-23 RX ORDER — NEOMYCIN SULFATE, POLYMYXIN B SULFATE, HYDROCORTISONE 3.5; 10000; 1 MG/ML; [USP'U]/ML; MG/ML
2 SOLUTION/ DROPS AURICULAR (OTIC) 4 TIMES DAILY
COMMUNITY
Start: 2024-08-13 | End: 2024-08-23 | Stop reason: WASHOUT

## 2024-08-23 ASSESSMENT — ENCOUNTER SYMPTOMS
FEVER: 0
CHILLS: 0
SHORTNESS OF BREATH: 0
PALPITATIONS: 0
HEADACHES: 0
DIZZINESS: 0
FATIGUE: 0
SINUS PRESSURE: 1
SORE THROAT: 1
SINUS PAIN: 1
WEAKNESS: 0
COUGH: 0

## 2024-08-23 NOTE — ASSESSMENT & PLAN NOTE
Start doxycycline 100 twice daily for 10 days  Has penicillin allergy and sulfa allergy  Follow-up with PCP  Risk/benefits/side effects of medication discussed

## 2024-08-23 NOTE — ASSESSMENT & PLAN NOTE
Treat with doxycycline  Follow-up with PCP  Call for worsening symptoms  Can use over-the-counter meds such as Sudafed for sinus tenderness and pressure  Can take antihistamine/Zyrtec 10 mg once daily for 14 days  Recommend purchasing Flonase over-the-counter as needed

## 2024-08-23 NOTE — PROGRESS NOTES
"Subjective   Patient ID: Carolina Murrell is a 49 y.o. female who presents for Earache (Follow up for patient left ear pain ).    Otitis externa follow up / Left   Denies, cough, congestion, fever  Some sore throat     Today she is also complaining of sinus pressure and tenderness.           Review of Systems   Constitutional:  Negative for chills, fatigue and fever.   HENT:  Positive for ear pain, postnasal drip, sinus pressure, sinus pain, sneezing and sore throat.    Respiratory:  Negative for cough and shortness of breath.    Cardiovascular:  Negative for chest pain and palpitations.   Neurological:  Negative for dizziness, weakness and headaches.       Objective   /82 (BP Location: Left arm, Patient Position: Sitting, BP Cuff Size: Large adult long)   Pulse 68   Temp 36.3 °C (97.4 °F) (Temporal)   Resp 16   Ht 1.676 m (5' 6\")   Wt (!) 174 kg (384 lb)   LMP 06/04/2024   SpO2 97%   BMI 61.98 kg/m²     Physical Exam  Vitals reviewed.   Constitutional:       Appearance: Normal appearance.   HENT:      Head: Normocephalic.      Right Ear: Hearing normal.      Left Ear: Decreased hearing noted. Tenderness present. No drainage. A middle ear effusion is present. Tympanic membrane is erythematous.      Nose:      Left Sinus: Frontal sinus tenderness present.   Cardiovascular:      Rate and Rhythm: Normal rate and regular rhythm.      Pulses: Normal pulses.      Heart sounds: Normal heart sounds.   Pulmonary:      Effort: Pulmonary effort is normal.   Neurological:      Mental Status: She is alert.         Assessment/Plan   Problem List Items Addressed This Visit             ICD-10-CM    Non-recurrent acute suppurative otitis media of left ear without spontaneous rupture of tympanic membrane - Primary H66.002     Start doxycycline 100 twice daily for 10 days  Has penicillin allergy and sulfa allergy  Follow-up with PCP  Risk/benefits/side effects of medication discussed         Relevant Medications    " methylPREDNISolone (Medrol Dospak) 4 mg tablets    doxycycline (Vibramycin) 100 mg capsule    Acute non-recurrent frontal sinusitis J01.10     Treat with doxycycline  Follow-up with PCP  Call for worsening symptoms  Can use over-the-counter meds such as Sudafed for sinus tenderness and pressure  Can take antihistamine/Zyrtec 10 mg once daily for 14 days  Recommend purchasing Flonase over-the-counter as needed         Relevant Medications    methylPREDNISolone (Medrol Dospak) 4 mg tablets    doxycycline (Vibramycin) 100 mg capsule

## 2024-08-27 ENCOUNTER — APPOINTMENT (OUTPATIENT)
Dept: NUTRITION | Facility: CLINIC | Age: 49
End: 2024-08-27
Payer: COMMERCIAL

## 2024-08-27 ENCOUNTER — TELEPHONE (OUTPATIENT)
Dept: PRIMARY CARE | Facility: CLINIC | Age: 49
End: 2024-08-27

## 2024-08-27 DIAGNOSIS — H66.90 ACUTE OTITIS MEDIA, UNSPECIFIED OTITIS MEDIA TYPE: Primary | ICD-10-CM

## 2024-08-27 RX ORDER — LEVOFLOXACIN 500 MG/1
500 TABLET, FILM COATED ORAL DAILY
Qty: 10 TABLET | Refills: 0 | Status: SHIPPED | OUTPATIENT
Start: 2024-08-27 | End: 2024-09-06

## 2024-08-27 NOTE — TELEPHONE ENCOUNTER
Patient called in and stated that she was seen by Carmen on 08/23/2024. The patient stated she was prescribed doxycycline but she is still having the same issue.  The patient would like to know do you want her to schedule an OV with you?

## 2024-09-03 ENCOUNTER — TELEPHONE (OUTPATIENT)
Dept: PRIMARY CARE | Facility: CLINIC | Age: 49
End: 2024-09-03
Payer: COMMERCIAL

## 2024-09-03 NOTE — TELEPHONE ENCOUNTER
Patient stated that her ears are not doing good and she can't hear out of her ear and it is leaking. The patient called ENT and can't get scheduled for at least a month out. The patient stated that she cannot go a month without being able to hear. The patient does not know if we can assist with getting her seen sooner.

## 2024-09-03 NOTE — TELEPHONE ENCOUNTER
Called over to dr Graham office [phone] twice but no answer and goes to voicemail.  Left a message to see how urgently we can get this patient seen with patient name and date of birth and our phone # if any questions

## 2024-10-28 DIAGNOSIS — R10.2 PELVIC PAIN IN FEMALE: ICD-10-CM

## 2024-10-30 ENCOUNTER — HOSPITAL ENCOUNTER (OUTPATIENT)
Dept: RADIOLOGY | Facility: HOSPITAL | Age: 49
Discharge: HOME | End: 2024-10-30
Payer: COMMERCIAL

## 2024-10-30 DIAGNOSIS — R10.2 PELVIC PAIN IN FEMALE: ICD-10-CM

## 2024-10-30 PROCEDURE — 76856 US EXAM PELVIC COMPLETE: CPT

## 2024-10-30 PROCEDURE — 76856 US EXAM PELVIC COMPLETE: CPT | Performed by: RADIOLOGY

## 2024-10-30 PROCEDURE — 76830 TRANSVAGINAL US NON-OB: CPT | Performed by: RADIOLOGY

## 2024-11-05 ENCOUNTER — OFFICE VISIT (OUTPATIENT)
Dept: OTOLARYNGOLOGY | Facility: CLINIC | Age: 49
End: 2024-11-05
Payer: COMMERCIAL

## 2024-11-05 VITALS — HEIGHT: 66 IN | BODY MASS INDEX: 47.09 KG/M2 | WEIGHT: 293 LBS

## 2024-11-05 DIAGNOSIS — H60.8X3 CHRONIC ECZEMATOUS OTITIS EXTERNA OF BOTH EARS: ICD-10-CM

## 2024-11-05 DIAGNOSIS — H61.21 IMPACTED CERUMEN OF RIGHT EAR: Primary | ICD-10-CM

## 2024-11-05 DIAGNOSIS — H69.93 DYSFUNCTION OF BOTH EUSTACHIAN TUBES: ICD-10-CM

## 2024-11-05 DIAGNOSIS — H60.391 OTHER INFECTIVE ACUTE OTITIS EXTERNA OF RIGHT EAR: ICD-10-CM

## 2024-11-05 DIAGNOSIS — J30.89 NON-SEASONAL ALLERGIC RHINITIS DUE TO OTHER ALLERGIC TRIGGER: ICD-10-CM

## 2024-11-05 PROCEDURE — 69210 REMOVE IMPACTED EAR WAX UNI: CPT | Performed by: OTOLARYNGOLOGY

## 2024-11-05 PROCEDURE — 99213 OFFICE O/P EST LOW 20 MIN: CPT | Performed by: OTOLARYNGOLOGY

## 2024-11-05 PROCEDURE — 1036F TOBACCO NON-USER: CPT | Performed by: OTOLARYNGOLOGY

## 2024-11-05 PROCEDURE — 3008F BODY MASS INDEX DOCD: CPT | Performed by: OTOLARYNGOLOGY

## 2024-11-05 NOTE — PROGRESS NOTES
Subjective   Patient ID: Carolina Murrell is a 49 y.o. female  HPI  Patient is complaining of congestion and fullness in the right ear after she started using the DermOtic drops.  She has been using Flonase and Astelin for her chronic allergies.  Review of Systems    Objective   Physical Exam  There is cerumen impaction on the right side with black mycotic debris.  This was suctioned and debrided and cleared under binocular magnification.  The tympanic membranes are clear and mobile.  There is nasal edema and the turbinates are pale.  There is no purulence or facial tenderness noted.    Ear cerumen removal    Date/Time: 11/5/2024 3:19 PM    Performed by: Blayne Mcclellan MD  Authorized by: Blayne Mcclellan MD    Consent:     Consent obtained:  Verbal    Risks discussed:  Pain  Procedure details:     Location:  R ear    Procedure type: curette        Assessment/Plan   Diagnoses and all orders for this visit:  Impacted cerumen of right ear (Primary)  -     Ear cerumen removal  Other infective acute otitis externa of right ear  Non-seasonal allergic rhinitis due to other allergic trigger  Dysfunction of both eustachian tubes  Chronic eczematous otitis externa of both ears     1.  Right mycotic otitis externa.  The ear canal was debrided and she was treated with topical boric acid.  2.  Chronic allergic rhinitis controlled with Flonase and Astelin nasal sprays.  3.  Chronic bilateral eustachian tube dysfunction controlled with the Valsalva maneuver as needed.  4.  Chronic eczema of external ear canals controlled with DermOtic drops and mometasone cream.

## 2024-11-11 ENCOUNTER — ANESTHESIA EVENT (OUTPATIENT)
Dept: OPERATING ROOM | Facility: HOSPITAL | Age: 49
End: 2024-11-11
Payer: COMMERCIAL

## 2024-11-12 ENCOUNTER — PRE-ADMISSION TESTING (OUTPATIENT)
Dept: PREADMISSION TESTING | Facility: HOSPITAL | Age: 49
End: 2024-11-12
Payer: COMMERCIAL

## 2024-11-12 VITALS
SYSTOLIC BLOOD PRESSURE: 143 MMHG | DIASTOLIC BLOOD PRESSURE: 92 MMHG | RESPIRATION RATE: 18 BRPM | HEART RATE: 88 BPM | TEMPERATURE: 97 F

## 2024-11-12 DIAGNOSIS — Z01.818 PRE-OP EVALUATION: Primary | ICD-10-CM

## 2024-11-12 DIAGNOSIS — Z12.11 SCREENING FOR MALIGNANT NEOPLASM OF COLON: ICD-10-CM

## 2024-11-12 DIAGNOSIS — E66.01 CLASS 3 SEVERE OBESITY DUE TO EXCESS CALORIES WITH BODY MASS INDEX (BMI) OF 50.0 TO 59.9 IN ADULT, UNSPECIFIED WHETHER SERIOUS COMORBIDITY PRESENT: ICD-10-CM

## 2024-11-12 DIAGNOSIS — I10 ESSENTIAL HYPERTENSION: ICD-10-CM

## 2024-11-12 DIAGNOSIS — E66.813 CLASS 3 SEVERE OBESITY DUE TO EXCESS CALORIES WITH BODY MASS INDEX (BMI) OF 50.0 TO 59.9 IN ADULT, UNSPECIFIED WHETHER SERIOUS COMORBIDITY PRESENT: ICD-10-CM

## 2024-11-12 LAB
ANION GAP SERPL CALC-SCNC: 12 MMOL/L (ref 10–20)
BUN SERPL-MCNC: 20 MG/DL (ref 6–23)
CALCIUM SERPL-MCNC: 9.2 MG/DL (ref 8.6–10.3)
CHLORIDE SERPL-SCNC: 102 MMOL/L (ref 98–107)
CO2 SERPL-SCNC: 28 MMOL/L (ref 21–32)
CREAT SERPL-MCNC: 0.78 MG/DL (ref 0.5–1.05)
EGFRCR SERPLBLD CKD-EPI 2021: >90 ML/MIN/1.73M*2
ERYTHROCYTE [DISTWIDTH] IN BLOOD BY AUTOMATED COUNT: 12.4 % (ref 11.5–14.5)
GLUCOSE SERPL-MCNC: 87 MG/DL (ref 74–99)
HCT VFR BLD AUTO: 47.1 % (ref 36–46)
HGB BLD-MCNC: 15.3 G/DL (ref 12–16)
MCH RBC QN AUTO: 28.2 PG (ref 26–34)
MCHC RBC AUTO-ENTMCNC: 32.5 G/DL (ref 32–36)
MCV RBC AUTO: 87 FL (ref 80–100)
NRBC BLD-RTO: 0 /100 WBCS (ref 0–0)
PLATELET # BLD AUTO: 295 X10*3/UL (ref 150–450)
POTASSIUM SERPL-SCNC: 4.7 MMOL/L (ref 3.5–5.3)
RBC # BLD AUTO: 5.43 X10*6/UL (ref 4–5.2)
SODIUM SERPL-SCNC: 137 MMOL/L (ref 136–145)
WBC # BLD AUTO: 8.4 X10*3/UL (ref 4.4–11.3)

## 2024-11-12 PROCEDURE — 80048 BASIC METABOLIC PNL TOTAL CA: CPT

## 2024-11-12 PROCEDURE — 36415 COLL VENOUS BLD VENIPUNCTURE: CPT

## 2024-11-12 PROCEDURE — 85027 COMPLETE CBC AUTOMATED: CPT

## 2024-11-12 ASSESSMENT — ENCOUNTER SYMPTOMS
CONSTITUTIONAL NEGATIVE: 1
CARDIOVASCULAR NEGATIVE: 1
PSYCHIATRIC NEGATIVE: 1
HEMATOLOGIC/LYMPHATIC NEGATIVE: 1
EYES NEGATIVE: 1
ALLERGIC/IMMUNOLOGIC NEGATIVE: 1
GASTROINTESTINAL NEGATIVE: 1
NEUROLOGICAL NEGATIVE: 1
RESPIRATORY NEGATIVE: 1
ENDOCRINE NEGATIVE: 1
MUSCULOSKELETAL NEGATIVE: 1

## 2024-11-12 ASSESSMENT — LIFESTYLE VARIABLES: SMOKING_STATUS: NONSMOKER

## 2024-11-12 ASSESSMENT — DUKE ACTIVITY SCORE INDEX (DASI)
CAN YOU DO MODERATE WORK AROUND THE HOUSE LIKE VACUUMING, SWEEPING FLOORS OR CARRYING GROCERIES: YES
CAN YOU WALK A BLOCK OR TWO ON LEVEL GROUND: YES
CAN YOU CLIMB A FLIGHT OF STAIRS OR WALK UP A HILL: YES
CAN YOU DO YARD WORK LIKE RAKING LEAVES, WEEDING OR PUSHING A MOWER: YES
CAN YOU PARTICIPATE IN MODERATE RECREATIONAL ACTIVITIES LIKE GOLF, BOWLING, DANCING, DOUBLES TENNIS OR THROWING A BASEBALL OR FOOTBALL: YES
CAN YOU DO HEAVY WORK AROUND THE HOUSE LIKE SCRUBBING FLOORS OR LIFTING AND MOVING HEAVY FURNITURE: YES
CAN YOU RUN A SHORT DISTANCE: NO
CAN YOU WALK INDOORS, SUCH AS AROUND YOUR HOUSE: YES
CAN YOU DO LIGHT WORK AROUND THE HOUSE LIKE DUSTING OR WASHING DISHES: YES
CAN YOU TAKE CARE OF YOURSELF (EAT, DRESS, BATHE, OR USE TOILET): YES
CAN YOU PARTICIPATE IN STRENOUS SPORTS LIKE SWIMMING, SINGLES TENNIS, FOOTBALL, BASKETBALL, OR SKIING: NO

## 2024-11-12 NOTE — H&P
History Of Present Illness  Carolina Murrell is a 49 y.o. female scheduled for colonoscopy under MAC anesthesia per Dr. Velazquez on 11/15/24. Patient denies n/v/d/c/melena/jenae blood/abd pain. States no family history of GI issues. This is patient's first colonoscopy.   Also scheduled for hysterectomy on 12/5/24 at another facility.      Past Medical History  Past Medical History:   Diagnosis Date    Chronic bronchitis (Multi)     Hyperlipidemia     Restless leg syndrome        Surgical History  Past Surgical History:   Procedure Laterality Date    ANKLE FRACTURE SURGERY      DILATION AND CURETTAGE OF UTERUS      NOSE SURGERY      TUBAL LIGATION      laparoscopic        Social History  She reports that she has never smoked. She has never been exposed to tobacco smoke. She has never used smokeless tobacco. She reports that she does not currently use alcohol after a past usage of about 1.0 standard drink of alcohol per week. She reports that she does not use drugs.    Family History  Family History   Problem Relation Name Age of Onset    Hypothyroidism Mother      Breast cancer Neg Hx          Allergies  Penicillins and Sulfa (sulfonamide antibiotics)    Review of Systems   Constitutional: Negative.    HENT: Negative.     Eyes: Negative.    Respiratory: Negative.     Cardiovascular: Negative.    Gastrointestinal: Negative.    Endocrine: Negative.    Genitourinary: Negative.    Musculoskeletal: Negative.    Skin: Negative.    Allergic/Immunologic: Negative.    Neurological: Negative.    Hematological: Negative.    Psychiatric/Behavioral: Negative.     All other systems reviewed and are negative.       Physical Exam  Vitals and nursing note reviewed.   Constitutional:       Appearance: Normal appearance. She is obese.   HENT:      Head: Normocephalic.      Nose: Nose normal.      Mouth/Throat:      Comments:   Mallampati: 2  TMD: >3  Finger breadth: 3  Dentition:  WNL  Neck ROM: full   Eyes:      Pupils: Pupils are equal,  round, and reactive to light.   Cardiovascular:      Rate and Rhythm: Normal rate and regular rhythm.      Heart sounds: Normal heart sounds, S1 normal and S2 normal.   Pulmonary:      Effort: Pulmonary effort is normal.      Breath sounds: Normal breath sounds.      Comments: Lungs clear throughout all fields.   Abdominal:      General: Bowel sounds are normal.      Palpations: Abdomen is soft.      Comments: Bowel sounds active x4 quads    Musculoskeletal:         General: Normal range of motion.      Cervical back: Normal range of motion.      Right lower leg: Edema present.      Left lower leg: Edema present.   Skin:     General: Skin is warm and dry.   Neurological:      General: No focal deficit present.      Mental Status: She is alert and oriented to person, place, and time.   Psychiatric:         Mood and Affect: Mood normal.         Behavior: Behavior normal.         Thought Content: Thought content normal.         Judgment: Judgment normal.          Last Recorded Vitals  Blood pressure (!) 143/92, pulse 88, temperature 36.1 °C (97 °F), temperature source Temporal, resp. rate 18.    DASI Risk Score      Flowsheet Row Pre-Admission Testing from 11/12/2024 in  [facility] Questionnaire Series Submission from 8/9/2024 in [facility] with Generic Provider Frandy   Can you take care of yourself (eat, dress, bathe, or use toilet)?  2.75 filed at 11/12/2024 1404 2.75  filed at 08/09/2024 1435   Can you walk indoors, such as around your house? 1.75 filed at 11/12/2024 1404 1.75  filed at 08/09/2024 1435   Can you walk a block or two on level ground?  2.75 filed at 11/12/2024 1404 2.75  filed at 08/09/2024 1435   Can you climb a flight of stairs or walk up a hill? 5.5 filed at 11/12/2024 1404 5.5  filed at 08/09/2024 1435   Can you run a short distance? 0 filed at 11/12/2024 1404 8  filed at 08/09/2024 1435   Can you do light work around the house like dusting or washing dishes? 2.7 filed at  11/12/2024 1404 2.7  filed at 08/09/2024 1435   Can you do moderate work around the house like vacuuming, sweeping floors or carrying groceries? 3.5 filed at 11/12/2024 1404 3.5  filed at 08/09/2024 1435   Can you do heavy work around the house like scrubbing floors or lifting and moving heavy furniture?  8 filed at 11/12/2024 1404 8  filed at 08/09/2024 1435   Can you do yard work like raking leaves, weeding or pushing a mower? 4.5 filed at 11/12/2024 1404 4.5  filed at 08/09/2024 1435   Can you have sexual relations? -- 5.25  filed at 08/09/2024 1435   Can you participate in moderate recreational activities like golf, bowling, dancing, doubles tennis or throwing a baseball or football? 6 filed at 11/12/2024 1404 6  filed at 08/09/2024 1435   Can you participate in strenous sports like swimming, singles tennis, football, basketball, or skiing? 0 filed at 11/12/2024 1404 7.5  filed at 08/09/2024 1435   DASI SCORE -- 58.2  filed at 08/09/2024 1435   METS Score (Will be calculated only when all the questions are answered) -- 9.9  filed at 08/09/2024 1435          Caprini DVT Assessment      Flowsheet Row Pre-Admission Testing from 11/12/2024 in  [facility] Pre-Admission Testing from 2/22/2024 in  [facility]   DVT Score 5 filed at 11/12/2024 1208 5 filed at 02/22/2024 1007   Surgical Factors Minor surgery planned filed at 11/12/2024 1208 --   BMI Greater than 50 (Venous stasis syndrome) filed at 11/12/2024 1208 Greater than 50 (Venous stasis syndrome) filed at 02/22/2024 1007   RETIRED: Current Status -- Minor surgery planned filed at 02/22/2024 1007   RETIRED: Age -- 40-59 years filed at 02/22/2024 1007          Modified Frailty Index    No data to display       CHADS2 Stroke Risk  Current as of about an hour ago        N/A 3 to 100%: High Risk   2 to < 3%: Medium Risk   0 to < 2%: Low Risk     Last Change: N/A          This score determines the patient's risk of having a stroke if the  patient has atrial fibrillation.        This score is not applicable to this patient. Components are not calculated.          Revised Cardiac Risk Index      Flowsheet Row Pre-Admission Testing from 11/12/2024 in  [facility] Pre-Admission Testing from 2/22/2024 in  [facility]   High-Risk Surgery (Intraperitoneal, Intrathoracic,Suprainguinal vascular) 0 filed at 11/12/2024 1209 0 filed at 02/22/2024 1008   History of ischemic heart disease (History of MI, History of positive exercuse test, Current chest paint considered due to myocardial ischemia, Use of nitrate therapy, ECG with pathological Q Waves) 0 filed at 11/12/2024 1209 0 filed at 02/22/2024 1008   History of congestive heart failure (pulmonary edemia, bilateral rales or S3 gallop, Paroxysmal nocturnal dyspnea, CXR showing pulmonary vascular redistribution) 0 filed at 11/12/2024 1209 0 filed at 02/22/2024 1008   History of cerebrovascular disease (Prior TIA or stroke) 0 filed at 11/12/2024 1209 0 filed at 02/22/2024 1008   Pre-operative insulin treatment 0 filed at 11/12/2024 1209 0 filed at 02/22/2024 1008   Pre-operative creatinine>2 mg/dl 0 filed at 11/12/2024 1209 0 filed at 02/22/2024 1008   Revised Cardiac Risk Calculator 0 filed at 11/12/2024 1209 0 filed at 02/22/2024 1008          Apfel Simplified Score      Flowsheet Row Pre-Admission Testing from 11/12/2024 in  [facility] Pre-Admission Testing from 2/22/2024 in  [facility]   Smoking status 1 filed at 11/12/2024 1209 1 filed at 02/22/2024 1010   History of motion sickness or PONV  0 filed at 11/12/2024 1209 0 filed at 02/22/2024 1010   Use of postoperative opioids 0 filed at 11/12/2024 1209 0 filed at 02/22/2024 1010   Gender - Female 1=Yes filed at 11/12/2024 1209 --   Apfel Simplified Score Calculator 2 filed at 11/12/2024 1209 2 filed at 02/22/2024 1010          Risk Analysis Index Results This Encounter    No data found in the last 10  encounters.       Stop Bang Score      Flowsheet Row Pre-Admission Testing from 11/12/2024 in  [facility] Questionnaire Series Submission from 8/9/2024 in [facility] with Generic Provider Frandy   Do you snore loudly? 1 filed at 11/12/2024 1403 0  filed at 08/09/2024 1435   Do you often feel tired or fatigued after your sleep? 0 filed at 11/12/2024 1403 0  filed at 08/09/2024 1435   Has anyone ever observed you stop breathing in your sleep? 0 filed at 11/12/2024 1403 0  filed at 08/09/2024 1435   Do you have or are you being treated for high blood pressure? 0 filed at 11/12/2024 1403 0  filed at 08/09/2024 1435   Recent BMI (Calculated) 54.9 filed at 11/12/2024 1403 62.2  filed at 08/09/2024 1435   Is BMI greater than 35 kg/m2? 1=Yes filed at 11/12/2024 1403 1=Yes  filed at 08/09/2024 1435   Age older than 50 years old? 1=Yes filed at 11/12/2024 1403 1=Yes  filed at 08/09/2024 1435   Is your neck circumference greater than 17 inches (Male) or 16 inches (Female)? 0 filed at 11/12/2024 1403 --   Gender - Male 0=No filed at 11/12/2024 1403 0=No  filed at 08/09/2024 1435   STOP-BANG Total Score 3 filed at 11/12/2024 1403 --          Prodigy: High Risk  Total Score: 0          ARISCAT Score for Postoperative Pulmonary Complications    No data to display       Minaya Perioperative Risk for Myocardial Infarction or Cardiac Arrest (BUD)    No data to display       Current Outpatient Medications on File Prior to Visit   Medication Sig Dispense Refill    albuterol 90 mcg/actuation inhaler 2 puffs q 4-6 hours as needed 54 g 1    azelastine (Astelin) 137 mcg (0.1 %) nasal spray Administer into affected nostril(s) twice a day.      b complex 0.4 mg tablet Take 1 tablet by mouth once daily.      cholecalciferol (Vitamin D3) 1,250 mcg (50,000 unit) tablet Take by mouth.      clobetasol (Temovate) 0.05 % ointment Clobetasol Propionate 0.05 % External Ointment   Quantity: 60  Refills: 0        Start : 17-Nov-2021    Active      fluticasone (Flonase) 50 mcg/actuation nasal spray Administer 2 sprays into each nostril once daily. 144 g 1    gabapentin (Neurontin) 100 mg capsule Take 1 capsule (100 mg) by mouth once daily at bedtime. 90 capsule 1    ibuprofen 600 mg tablet Take 1 tablet (600 mg) by mouth 3 times a day as needed.      ipratropium-albuteroL (Duo-Neb) 0.5-2.5 mg/3 mL nebulizer solution       magnesium 200 mg tablet Take by mouth. (Patient not taking: Reported on 11/12/2024)      mometasone (Elocon) 0.1 % cream APPLY THIN LAYER TOPICALLY TO THE AFFECTED AREA EVERY DAY AS NEEDED FOR ITCHING      montelukast (Singulair) 10 mg tablet Take 1 tablet (10 mg) by mouth once daily. 90 tablet 1    rOPINIRole (Requip) 2 mg tablet Take 1 tablet (2 mg) by mouth once daily at bedtime. 90 tablet 1    Symbicort 160-4.5 mcg/actuation inhaler Inhale 2 puffs 2 times a day. Rinse mouth after use       No current facility-administered medications on file prior to visit.      Current Outpatient Medications on File Prior to Visit   Medication Sig Dispense Refill    albuterol 90 mcg/actuation inhaler 2 puffs q 4-6 hours as needed 54 g 1    azelastine (Astelin) 137 mcg (0.1 %) nasal spray Administer into affected nostril(s) twice a day.      b complex 0.4 mg tablet Take 1 tablet by mouth once daily.      cholecalciferol (Vitamin D3) 1,250 mcg (50,000 unit) tablet Take by mouth.      clobetasol (Temovate) 0.05 % ointment Clobetasol Propionate 0.05 % External Ointment   Quantity: 60  Refills: 0        Start : 17-Nov-2021   Active      fluticasone (Flonase) 50 mcg/actuation nasal spray Administer 2 sprays into each nostril once daily. 144 g 1    gabapentin (Neurontin) 100 mg capsule Take 1 capsule (100 mg) by mouth once daily at bedtime. 90 capsule 1    ibuprofen 600 mg tablet Take 1 tablet (600 mg) by mouth 3 times a day as needed.      ipratropium-albuteroL (Duo-Neb) 0.5-2.5 mg/3 mL nebulizer solution       magnesium 200 mg tablet Take by  mouth. (Patient not taking: Reported on 11/12/2024)      mometasone (Elocon) 0.1 % cream APPLY THIN LAYER TOPICALLY TO THE AFFECTED AREA EVERY DAY AS NEEDED FOR ITCHING      montelukast (Singulair) 10 mg tablet Take 1 tablet (10 mg) by mouth once daily. 90 tablet 1    rOPINIRole (Requip) 2 mg tablet Take 1 tablet (2 mg) by mouth once daily at bedtime. 90 tablet 1    Symbicort 160-4.5 mcg/actuation inhaler Inhale 2 puffs 2 times a day. Rinse mouth after use       No current facility-administered medications on file prior to visit.      Relevant Results  Results for orders placed or performed in visit on 11/12/24 (from the past 24 hours)   Basic Metabolic Panel   Result Value Ref Range    Glucose 87 74 - 99 mg/dL    Sodium 137 136 - 145 mmol/L    Potassium 4.7 3.5 - 5.3 mmol/L    Chloride 102 98 - 107 mmol/L    Bicarbonate 28 21 - 32 mmol/L    Anion Gap 12 10 - 20 mmol/L    Urea Nitrogen 20 6 - 23 mg/dL    Creatinine 0.78 0.50 - 1.05 mg/dL    eGFR >90 >60 mL/min/1.73m*2    Calcium 9.2 8.6 - 10.3 mg/dL   CBC   Result Value Ref Range    WBC 8.4 4.4 - 11.3 x10*3/uL    nRBC 0.0 0.0 - 0.0 /100 WBCs    RBC 5.43 (H) 4.00 - 5.20 x10*6/uL    Hemoglobin 15.3 12.0 - 16.0 g/dL    Hematocrit 47.1 (H) 36.0 - 46.0 %    MCV 87 80 - 100 fL    MCH 28.2 26.0 - 34.0 pg    MCHC 32.5 32.0 - 36.0 g/dL    RDW 12.4 11.5 - 14.5 %    Platelets 295 150 - 450 x10*3/uL             Assessment/Plan   Problem List Items Addressed This Visit       Essential hypertension    Relevant Orders    Basic Metabolic Panel    CBC    Class 3 severe obesity in adult    Relevant Orders    Basic Metabolic Panel    CBC     Other Visit Diagnoses       Pre-op evaluation    -  Primary    Relevant Orders    Basic Metabolic Panel    CBC    Screening for malignant neoplasm of colon                Scheduled for colonoscopy under MAC anesthesia per Dr. Velazquez on 11/15/24.  CBC, BMP ordered. Reviewed and these are WNL and acceptable for upcoming surgery.   EKG shows SR, LAFB,  anterior infarct; dated 3/2024. Infarct also noted on 2/22/24 EKG and January 2021 EKG.   H&P and airway assessment completed today.  May use inhalers any time prior to procedure.   All surgery instructions reviewed with patient by RN. Verbalized understanding.        Chandrika Loaiza, APRN-CNS

## 2024-11-12 NOTE — PREPROCEDURE INSTRUCTIONS
Medication List            Accurate as of November 12, 2024  2:00 PM. Always use your most recent med list.                albuterol 90 mcg/actuation inhaler  2 puffs q 4-6 hours as needed  Medication Adjustments for Surgery: Take on the morning of surgery     azelastine 137 mcg (0.1 %) nasal spray  Commonly known as: Astelin  Medication Adjustments for Surgery: Take on the morning of surgery     b complex 0.4 mg tablet  Medication Adjustments for Surgery: Take last dose 1 day (24 hours) before surgery     cholecalciferol 1,250 mcg (50,000 unit) tablet  Commonly known as: Vitamin D3  Medication Adjustments for Surgery: Take last dose 1 day (24 hours) before surgery     clobetasol 0.05 % ointment  Commonly known as: Temovate     fluticasone 50 mcg/actuation nasal spray  Commonly known as: Flonase  Administer 2 sprays into each nostril once daily.  Medication Adjustments for Surgery: Take/Use as prescribed     gabapentin 100 mg capsule  Commonly known as: Neurontin  Take 1 capsule (100 mg) by mouth once daily at bedtime.  Medication Adjustments for Surgery: Take last dose 1 day (24 hours) before surgery     ibuprofen 600 mg tablet  Medication Adjustments for Surgery: Take last dose 3 days before surgery     ipratropium-albuteroL 0.5-2.5 mg/3 mL nebulizer solution  Commonly known as: Duo-Neb  Medication Adjustments for Surgery: Take on the morning of surgery     magnesium 200 mg tablet  Medication Adjustments for Surgery: Take last dose 3 days before surgery     mometasone 0.1 % cream  Commonly known as: Elocon     montelukast 10 mg tablet  Commonly known as: Singulair  Take 1 tablet (10 mg) by mouth once daily.  Medication Adjustments for Surgery: Take on the morning of surgery     rOPINIRole 2 mg tablet  Commonly known as: Requip  Take 1 tablet (2 mg) by mouth once daily at bedtime.  Medication Adjustments for Surgery: Take last dose 1 day (24 hours) before surgery     Symbicort 160-4.5 mcg/actuation  inhaler  Generic drug: budesonide-formoteroL  Medication Adjustments for Surgery: Take on the morning of surgery                              NPO Instructions:    Do not eat any food after midnight the night before your surgery/procedure.    Additional Instructions:     The Day before Surgery:  Review your medication instructions, stop indicated medications  You will be contacted regarding the time of your arrival to facility and surgery time  Do not eat any food after Midnight  Go to admitting on arrival to the hospital, bring photo ID and insurance card  Follow instructions for prep.  Drink plenty of water the day before the procedure.  Need a  to take you home.  Wear comfortable clothes.  Leave all valuables at home.  Take off all jewelry.

## 2024-11-15 ENCOUNTER — HOSPITAL ENCOUNTER (OUTPATIENT)
Dept: OPERATING ROOM | Facility: HOSPITAL | Age: 49
End: 2024-11-15
Payer: COMMERCIAL

## 2024-11-15 ENCOUNTER — HOSPITAL ENCOUNTER (OUTPATIENT)
Dept: CARDIOLOGY | Facility: HOSPITAL | Age: 49
Discharge: HOME | End: 2024-11-15
Payer: COMMERCIAL

## 2024-11-15 ENCOUNTER — ANESTHESIA (OUTPATIENT)
Dept: OPERATING ROOM | Facility: HOSPITAL | Age: 49
End: 2024-11-15
Payer: COMMERCIAL

## 2024-11-15 VITALS
TEMPERATURE: 98 F | BODY MASS INDEX: 47.09 KG/M2 | OXYGEN SATURATION: 99 % | HEIGHT: 66 IN | SYSTOLIC BLOOD PRESSURE: 129 MMHG | HEART RATE: 70 BPM | RESPIRATION RATE: 18 BRPM | WEIGHT: 293 LBS | DIASTOLIC BLOOD PRESSURE: 84 MMHG

## 2024-11-15 DIAGNOSIS — Z12.11 SCREEN FOR COLON CANCER: ICD-10-CM

## 2024-11-15 LAB — PREGNANCY TEST URINE, POC: NEGATIVE

## 2024-11-15 PROCEDURE — 2500000004 HC RX 250 GENERAL PHARMACY W/ HCPCS (ALT 636 FOR OP/ED): Performed by: NURSE ANESTHETIST, CERTIFIED REGISTERED

## 2024-11-15 PROCEDURE — 7100000009 HC PHASE TWO TIME - INITIAL BASE CHARGE: Performed by: NURSE ANESTHETIST, CERTIFIED REGISTERED

## 2024-11-15 PROCEDURE — 81025 URINE PREGNANCY TEST: CPT | Performed by: ANESTHESIOLOGY

## 2024-11-15 PROCEDURE — 3600000007 HC OR TIME - EACH INCREMENTAL 1 MINUTE - PROCEDURE LEVEL TWO: Performed by: NURSE ANESTHETIST, CERTIFIED REGISTERED

## 2024-11-15 PROCEDURE — 3700000001 HC GENERAL ANESTHESIA TIME - INITIAL BASE CHARGE: Performed by: NURSE ANESTHETIST, CERTIFIED REGISTERED

## 2024-11-15 PROCEDURE — 2500000004 HC RX 250 GENERAL PHARMACY W/ HCPCS (ALT 636 FOR OP/ED): Performed by: ANESTHESIOLOGY

## 2024-11-15 PROCEDURE — 93005 ELECTROCARDIOGRAM TRACING: CPT

## 2024-11-15 PROCEDURE — 3600000002 HC OR TIME - INITIAL BASE CHARGE - PROCEDURE LEVEL TWO: Performed by: NURSE ANESTHETIST, CERTIFIED REGISTERED

## 2024-11-15 PROCEDURE — 3700000002 HC GENERAL ANESTHESIA TIME - EACH INCREMENTAL 1 MINUTE: Performed by: NURSE ANESTHETIST, CERTIFIED REGISTERED

## 2024-11-15 PROCEDURE — 2500000001 HC RX 250 WO HCPCS SELF ADMINISTERED DRUGS (ALT 637 FOR MEDICARE OP): Performed by: ANESTHESIOLOGY

## 2024-11-15 PROCEDURE — 7100000010 HC PHASE TWO TIME - EACH INCREMENTAL 1 MINUTE: Performed by: NURSE ANESTHETIST, CERTIFIED REGISTERED

## 2024-11-15 RX ORDER — ONDANSETRON HYDROCHLORIDE 2 MG/ML
4 INJECTION, SOLUTION INTRAVENOUS ONCE
Status: COMPLETED | OUTPATIENT
Start: 2024-11-15 | End: 2024-11-15

## 2024-11-15 RX ORDER — FAMOTIDINE 10 MG/ML
20 INJECTION INTRAVENOUS ONCE
Status: COMPLETED | OUTPATIENT
Start: 2024-11-15 | End: 2024-11-15

## 2024-11-15 RX ORDER — ALBUTEROL SULFATE 0.83 MG/ML
2.5 SOLUTION RESPIRATORY (INHALATION) ONCE
Status: DISCONTINUED | OUTPATIENT
Start: 2024-11-15 | End: 2024-11-15 | Stop reason: HOSPADM

## 2024-11-15 RX ORDER — SODIUM CITRATE AND CITRIC ACID MONOHYDRATE 334; 500 MG/5ML; MG/5ML
30 SOLUTION ORAL ONCE
Status: COMPLETED | OUTPATIENT
Start: 2024-11-15 | End: 2024-11-15

## 2024-11-15 RX ORDER — PROPOFOL 10 MG/ML
INJECTION, EMULSION INTRAVENOUS AS NEEDED
Status: DISCONTINUED | OUTPATIENT
Start: 2024-11-15 | End: 2024-11-15

## 2024-11-15 RX ORDER — METOCLOPRAMIDE HYDROCHLORIDE 5 MG/ML
10 INJECTION INTRAMUSCULAR; INTRAVENOUS ONCE
Status: COMPLETED | OUTPATIENT
Start: 2024-11-15 | End: 2024-11-15

## 2024-11-15 RX ORDER — SODIUM CHLORIDE 9 MG/ML
20 INJECTION, SOLUTION INTRAVENOUS CONTINUOUS
Status: DISCONTINUED | OUTPATIENT
Start: 2024-11-15 | End: 2024-11-15 | Stop reason: HOSPADM

## 2024-11-15 SDOH — HEALTH STABILITY: MENTAL HEALTH: CURRENT SMOKER: 0

## 2024-11-15 ASSESSMENT — COLUMBIA-SUICIDE SEVERITY RATING SCALE - C-SSRS
6. HAVE YOU EVER DONE ANYTHING, STARTED TO DO ANYTHING, OR PREPARED TO DO ANYTHING TO END YOUR LIFE?: NO
1. IN THE PAST MONTH, HAVE YOU WISHED YOU WERE DEAD OR WISHED YOU COULD GO TO SLEEP AND NOT WAKE UP?: NO
2. HAVE YOU ACTUALLY HAD ANY THOUGHTS OF KILLING YOURSELF?: NO

## 2024-11-15 ASSESSMENT — PAIN SCALES - GENERAL
PAINLEVEL_OUTOF10: 0 - NO PAIN
PAINLEVEL_OUTOF10: 0 - NO PAIN
PAINLEVEL_OUTOF10: 4
PAINLEVEL_OUTOF10: 1
PAINLEVEL_OUTOF10: 4
PAIN_LEVEL: 0

## 2024-11-15 ASSESSMENT — PAIN - FUNCTIONAL ASSESSMENT
PAIN_FUNCTIONAL_ASSESSMENT: 0-10

## 2024-11-15 NOTE — H&P
"History Of Present Illness  Carolina Murrell is a 49 y.o. female presenting with screening.     Past Medical History  She has a past medical history of Chronic bronchitis (Multi), Hyperlipidemia, and Restless leg syndrome.    Surgical History  She has a past surgical history that includes Dilation and curettage of uterus; Tubal ligation; Ankle fracture surgery; and Nose surgery.     Social History  She reports that she has never smoked. She has never been exposed to tobacco smoke. She has never used smokeless tobacco. She reports that she does not currently use alcohol after a past usage of about 1.0 standard drink of alcohol per week. She reports that she does not use drugs.    Family History  Family History   Problem Relation Name Age of Onset    Hypothyroidism Mother      Breast cancer Neg Hx          Allergies  Penicillins and Sulfa (sulfonamide antibiotics)    Review of Systems   All other systems reviewed and are negative.       Physical Exam  Vitals reviewed.   Cardiovascular:      Rate and Rhythm: Normal rate and regular rhythm.   Pulmonary:      Breath sounds: Normal breath sounds.   Skin:     General: Skin is warm and dry.   Neurological:      Mental Status: She is alert.   Psychiatric:         Mood and Affect: Mood normal.          Last Recorded Vitals  Pulse 69, temperature 36.7 °C (98.1 °F), temperature source Temporal, resp. rate 12, height 1.676 m (5' 6\"), weight (!) 174 kg (384 lb), last menstrual period 04/01/2024, SpO2 100%.    Relevant Results               Assessment/Plan   Assessment & Plan  Screen for colon cancer      For colo       I spent 15 minutes in the professional and overall care of this patient.      Erwin Velazquez MD  "

## 2024-11-15 NOTE — ANESTHESIA PREPROCEDURE EVALUATION
Patient: Carolina Murrell    Procedure Information       Date/Time: 11/15/24 0800    Scheduled providers: Erwin Velazquez MD    Procedure: COLONOSCOPY    Location: [facility] OR            Relevant Problems   Cardiac   (+) Essential hypertension   (+) Hypercholesterolemia      Endocrine   (+) Class 3 severe obesity in adult      HEENT   (+) Acute non-recurrent frontal sinusitis      GYN   (+) Abnormal uterine bleeding (AUB)       Clinical information reviewed:   Tobacco  Allergies  Meds   Med Hx  Surg Hx  OB Status  Fam Hx  Soc   Hx        NPO Detail:  NPO/Void Status  Date of Last Liquid: 11/15/24  Time of Last Liquid: 0530  Date of Last Solid: 11/14/24  Time of Last Solid: 0600  Last Intake Type: Clear fluids         Physical Exam    Airway  Mallampati: II  TM distance: >3 FB  Neck ROM: full     Cardiovascular - normal exam     Dental - normal exam     Pulmonary - normal exam     Abdominal            Anesthesia Plan    History of general anesthesia?: yes  History of complications of general anesthesia?: no    ASA 3     MAC     The patient is not a current smoker.    intravenous induction   Anesthetic plan and risks discussed with patient.  Use of blood products discussed with patient who consented to blood products.    Plan discussed with CRNA.

## 2024-11-15 NOTE — ANESTHESIA POSTPROCEDURE EVALUATION
Patient: Carolina Murrell    Procedure Summary       Date: 11/15/24 Room / Location: [facility] OR    Anesthesia Start: 0759 Anesthesia Stop: 0837    Procedure: COLONOSCOPY Diagnosis: Screen for colon cancer    Scheduled Providers: Erwin Velazquez MD Responsible Provider: CHANTEL Harvey    Anesthesia Type: MAC ASA Status: 3            Anesthesia Type: MAC    Vitals Value Taken Time   /84 11/15/24 0913   Temp 36.7 °C (98 °F) 11/15/24 0835   Pulse 70 11/15/24 0913   Resp 18 11/15/24 0913   SpO2 99 % 11/15/24 0913       Anesthesia Post Evaluation    Patient location during evaluation: PACU  Patient participation: complete - patient participated  Level of consciousness: awake and alert  Pain score: 0  Pain management: adequate  Airway patency: patent  Cardiovascular status: hemodynamically stable  Respiratory status: room air  Hydration status: acceptable  Postoperative Nausea and Vomiting: mild    No notable events documented.

## 2024-11-16 LAB
ATRIAL RATE: 68 BPM
P AXIS: 17 DEGREES
PR INTERVAL: 170 MS
Q ONSET: 252 MS
QRS COUNT: 11 BEATS
QRS DURATION: 104 MS
QT INTERVAL: 431 MS
QTC CALCULATION(BAZETT): 459 MS
QTC FREDERICIA: 449 MS
R AXIS: -37 DEGREES
T AXIS: 43 DEGREES
T OFFSET: 467 MS
VENTRICULAR RATE: 68 BPM

## 2024-11-22 ENCOUNTER — PRE-ADMISSION TESTING (OUTPATIENT)
Dept: PREADMISSION TESTING | Facility: HOSPITAL | Age: 49
End: 2024-11-22
Payer: COMMERCIAL

## 2024-11-22 VITALS
WEIGHT: 293 LBS | DIASTOLIC BLOOD PRESSURE: 92 MMHG | HEART RATE: 72 BPM | OXYGEN SATURATION: 98 % | BODY MASS INDEX: 47.09 KG/M2 | TEMPERATURE: 98.1 F | SYSTOLIC BLOOD PRESSURE: 131 MMHG | HEIGHT: 66 IN

## 2024-11-22 DIAGNOSIS — N85.02 EIN (ENDOMETRIAL INTRAEPITHELIAL NEOPLASIA): ICD-10-CM

## 2024-11-22 DIAGNOSIS — Z01.818 PREOP EXAMINATION: ICD-10-CM

## 2024-11-22 LAB
ABO GROUP (TYPE) IN BLOOD: NORMAL
ANTIBODY SCREEN: NORMAL
RH FACTOR (ANTIGEN D): NORMAL

## 2024-11-22 PROCEDURE — 99204 OFFICE O/P NEW MOD 45 MIN: CPT | Performed by: NURSE PRACTITIONER

## 2024-11-22 PROCEDURE — 36415 COLL VENOUS BLD VENIPUNCTURE: CPT

## 2024-11-22 PROCEDURE — 86901 BLOOD TYPING SEROLOGIC RH(D): CPT

## 2024-11-22 ASSESSMENT — ENCOUNTER SYMPTOMS
CARDIOVASCULAR NEGATIVE: 1
NECK NEGATIVE: 1
RESPIRATORY NEGATIVE: 1
NEUROLOGICAL NEGATIVE: 1
GASTROINTESTINAL NEGATIVE: 1
CONSTITUTIONAL NEGATIVE: 1
ENDOCRINE NEGATIVE: 1
EYES NEGATIVE: 1
MUSCULOSKELETAL NEGATIVE: 1

## 2024-11-22 ASSESSMENT — DUKE ACTIVITY SCORE INDEX (DASI)
DASI METS SCORE: 9.9
CAN YOU DO MODERATE WORK AROUND THE HOUSE LIKE VACUUMING, SWEEPING FLOORS OR CARRYING GROCERIES: YES
CAN YOU DO YARD WORK LIKE RAKING LEAVES, WEEDING OR PUSHING A MOWER: YES
CAN YOU DO HEAVY WORK AROUND THE HOUSE LIKE SCRUBBING FLOORS OR LIFTING AND MOVING HEAVY FURNITURE: YES
CAN YOU WALK A BLOCK OR TWO ON LEVEL GROUND: YES
CAN YOU TAKE CARE OF YOURSELF (EAT, DRESS, BATHE, OR USE TOILET): YES
CAN YOU DO LIGHT WORK AROUND THE HOUSE LIKE DUSTING OR WASHING DISHES: YES
CAN YOU PARTICIPATE IN STRENOUS SPORTS LIKE SWIMMING, SINGLES TENNIS, FOOTBALL, BASKETBALL, OR SKIING: YES
CAN YOU HAVE SEXUAL RELATIONS: YES
CAN YOU WALK INDOORS, SUCH AS AROUND YOUR HOUSE: YES
CAN YOU PARTICIPATE IN MODERATE RECREATIONAL ACTIVITIES LIKE GOLF, BOWLING, DANCING, DOUBLES TENNIS OR THROWING A BASEBALL OR FOOTBALL: YES
TOTAL_SCORE: 58.2
CAN YOU RUN A SHORT DISTANCE: YES
CAN YOU CLIMB A FLIGHT OF STAIRS OR WALK UP A HILL: YES

## 2024-11-22 NOTE — PREPROCEDURE INSTRUCTIONS
Fasting Guidelines    NPO Instructions:    Do not eat any food after midnight the night before your surgery/procedure.  You may have up to 13.5 ounces of clear liquids until TWO hours before your instructed arrival time to the hospital. This includes water, black tea/coffee, (no milk or cream), apple juice, and/or electrolyte drinks (Gatorade).  You may chew gum up to TWO hours before your surgery/procedure.    Additional Instructions:    Avoid herbal supplements, multivitamins and NSAIDS (non-steroidal anti-inflammatory drugs) such as Advil, Aleve, Ibuprofen, Naproxen, Excedrin, Meloxicam or Celebrex for at least 7 days prior to surgery. May take Tylenol as needed.    Avoid tobacco and alcohol products for 24 hours prior to surgery.    CONTACT SURGEON'S OFFICE IF YOU DEVELOP:  * Fever = 100.4 F   * New respiratory symptoms (e.g. cough, shortness of breath, respiratory distress, sore throat)  * Recent loss of taste or smell  *Flu like symptoms such as headache, fatigue or gastrointestinal symptoms  * You develop any open sores, shingles, burning or painful urination   AND/OR:  * You no longer wish to have the surgery.  * Any other personal circumstances change that may lead to the need to cancel or defer this surgery.  *You were admitted to any hospital within one week of your planned procedure.    Seven/Six Days before Surgery:  Review your medication instructions, stop indicated medications    Day of Surgery:  Review your medication instructions, take indicated medications  Wear comfortable loose fitting clothing  Do not use moisturizers, creams, lotions or perfume  All jewelry and valuables should be left at home    Nely Cardenas [facility]  [facility]  [phone]  [phone]  Email-[email or web address]      Preoperative Brain Exercises    What are brain exercises?  A brain exercise is any activity that engages your thinking (cognitive) skills.    What types of  activities are considered brain exercises?  Jigsaw puzzles, crossword puzzles, word jumble, memory games, word search, and many more.  Many can be found free online or on your phone via a mobile joselyn.    Why should I do brain exercises before my surgery?  More recent research has shown brain exercise before surgery can lower the risk of postoperative delirium (confusion) which can be especially important for older adults.  Patients who did brain exercises for 5 to 10 hours the days before surgery, cut their risk of postoperative delirium in half up to 1 week after surgery.         [facility]    Preoperative Deep Breathing Exercises    Why it is important to do deep breathing exercises before my surgery?  Deep breathing exercises strengthen your breathing muscles.  This helps you to recover after your surgery and decreases the chance of breathing complications.      How are the deep breathing exercises done?  Sit straight with your back supported.  Breathe in deeply and slowly through your nose. Your lower rib cage should expand and your abdomen may move forward.  Hold that breath for 3 to 5 seconds.  Breathe out through pursed lips, slowly and completely.  Rest and repeat 10 times every hour while awake.  Rest longer if you become dizzy or lightheaded.         Patient and Family Education             Ways You Can Help Prevent Blood Clots             This handout explains some simple things you can do to help prevent blood clots.      Blood clots are blockages that can form in the body's veins. When a blood clot forms in your deep veins, it may be called a deep vein thrombosis, or DVT for short. Blood clots can happen in any part of the body where blood flows, but they are most common in the arms and legs. If a piece of a blood clot breaks free and travels to the lungs, it is called a pulmonary embolus (PE). A PE can be a very serious problem.         Being in the hospital or having surgery  can raise your chances of getting a blood clot because you may not be well enough to move around as much as you normally do.         Ways you can help prevent blood clots in the hospital         Wearing SCDs. SCDs stands for Sequential Compression Devices.   SCDs are special sleeves that wrap around your legs  They attach to a pump that fills them with air to gently squeeze your legs every few minutes.   This helps return the blood in your legs to your heart.   SCDs should only be taken off when walking or bathing.   SCDs may not be comfortable, but they can help save your life.               Wearing compression stockings - if your doctor orders them. These special snug fitting stockings gently squeeze your legs to help blood flow.       Walking. Walking helps move the blood in your legs.   If your doctor says it is ok, try walking the halls at least   5 times a day. Ask us to help you get up, so you don't fall.      Taking any blood thinning medicines your doctor orders.        Page 1 of 2     [facility]; 3/23   Ways you can help prevent blood clots at home       Wearing compression stockings - if your doctor orders them. ? Walking - to help move the blood in your legs.       Taking any blood thinning medicines your doctor orders.      Signs of a blood clot or PE      Tell your doctor or nurse know right away if you have of the problems listed below.    If you are at home, seek medical care right away. Call 911 for chest pain or problems breathing.               Signs of a blood clot (DVT) - such as pain,  swelling, redness or warmth in your arm or leg      Signs of a pulmonary embolism (PE) - such as chest     pain or feeling short of breath

## 2024-11-22 NOTE — CPM/PAT H&P
CPM/PAT Evaluation       Name: Carolina Murrell (Carolina Murrell)  /Age: 1975/49 y.o.     Visit Type:   In-Person       Chief Complaint: perioperative evaluation      HPI  The patient is a 49 year old female with  atypical endometrial hyperplasia (EIN). She is status post recent D+C 2024 w/ normal pathology and placement of Mirena IUD. Repeat EMB was performed 2024 and showed no e/o persistent hyperplasia. She presents now for perioperative evaluation in anticipation of Total robotic hysterectomy, bilateral salpingectomy, any indicated procedure - Bilateral on 24.    Past Medical History:   Diagnosis Date    Chronic bronchitis (Multi)     Hyperlipidemia     Restless leg syndrome        Past Surgical History:   Procedure Laterality Date    ANKLE FRACTURE SURGERY      DILATION AND CURETTAGE OF UTERUS      NOSE SURGERY      TUBAL LIGATION      laparoscopic       Patient  reports being sexually active and has had partner(s) who are male. She reports using the following method of birth control/protection: I.U.D..    Family History   Problem Relation Name Age of Onset    Hypothyroidism Mother      Breast cancer Neg Hx         Allergies   Allergen Reactions    Penicillins Anaphylaxis, Itching and Swelling    Sulfa (Sulfonamide Antibiotics) Fever, Hives, Nausea Only and Other       Prior to Admission medications    Medication Sig Start Date End Date Taking? Authorizing Provider   albuterol 90 mcg/actuation inhaler 2 puffs q 4-6 hours as needed 24   Bryson Burton MD   azelastine (Astelin) 137 mcg (0.1 %) nasal spray Administer into affected nostril(s) twice a day. 21   Historical Provider, MD damon complex 0.4 mg tablet Take 1 tablet by mouth once daily.    Historical Provider, MD   cholecalciferol (Vitamin D3) 1,250 mcg (50,000 unit) tablet Take by mouth. 19   Historical Provider, MD   clobetasol (Temovate) 0.05 % ointment Clobetasol Propionate 0.05 % External Ointment   Quantity: 60   Refills: 0        Start : 17-Nov-2021   Active  Patient not taking: Reported on 11/15/2024 11/17/21   Historical Provider, MD   fluticasone (Flonase) 50 mcg/actuation nasal spray Administer 2 sprays into each nostril once daily. 7/30/24   Bryson Burton MD   gabapentin (Neurontin) 100 mg capsule Take 1 capsule (100 mg) by mouth once daily at bedtime. 7/30/24   Bryson Burton MD   ibuprofen 600 mg tablet Take 1 tablet (600 mg) by mouth 3 times a day as needed. 2/21/20   Historical Provider, MD   ipratropium-albuteroL (Duo-Neb) 0.5-2.5 mg/3 mL nebulizer solution  3/6/23   Historical Provider, MD   magnesium 200 mg tablet Take by mouth. 11/20/19   Historical Provider, MD   mometasone (Elocon) 0.1 % cream APPLY THIN LAYER TOPICALLY TO THE AFFECTED AREA EVERY DAY AS NEEDED FOR ITCHING  Patient not taking: Reported on 11/15/2024 5/20/21   Historical Provider, MD   montelukast (Singulair) 10 mg tablet Take 1 tablet (10 mg) by mouth once daily. 7/30/24   Bryson Burton MD   rOPINIRole (Requip) 2 mg tablet Take 1 tablet (2 mg) by mouth once daily at bedtime. 7/30/24   Bryson Burton MD   Symbicort 160-4.5 mcg/actuation inhaler Inhale 2 puffs 2 times a day. Rinse mouth after use  Patient not taking: Reported on 11/15/2024 3/1/23   Historical Provider, MD HERNANDEZ ROS:   Constitutional:   neg    Neuro/Psych:   neg    Eyes:   neg     use of corrective lenses  Ears:   neg    Nose:   neg    Mouth:   neg    Throat:   neg    Neck:   neg    Cardio:   neg    Respiratory:   neg    Endocrine:   neg    GI:   neg    :   neg    Musculoskeletal:   neg    Hematologic:   neg    Skin:  neg        Physical Exam  Vitals reviewed.   Constitutional:       Appearance: Normal appearance. She is obese.   HENT:      Head: Normocephalic and atraumatic.      Nose: Nose normal.      Mouth/Throat:      Mouth: Mucous membranes are moist.      Pharynx: Oropharynx is clear.   Eyes:      Extraocular Movements: Extraocular movements intact.       "Conjunctiva/sclera: Conjunctivae normal.      Pupils: Pupils are equal, round, and reactive to light.   Cardiovascular:      Rate and Rhythm: Normal rate and regular rhythm.      Pulses: Normal pulses.      Heart sounds: Normal heart sounds.   Pulmonary:      Effort: Pulmonary effort is normal.      Breath sounds: Normal breath sounds.   Musculoskeletal:         General: Normal range of motion.      Cervical back: Normal range of motion.   Skin:     General: Skin is warm and dry.   Neurological:      General: No focal deficit present.      Mental Status: She is alert and oriented to person, place, and time.   Psychiatric:         Mood and Affect: Mood normal.         Behavior: Behavior normal.          PAT AIRWAY:   Airway:     Mallampati::  I    TM distance::  >3 FB    Neck ROM::  Full  normal          Visit Vitals  BP (!) 131/92   Pulse 72   Temp 36.7 °C (98.1 °F)   Ht 1.676 m (5' 6\")   Wt (!) 177 kg (389 lb 9.6 oz)   LMP 04/01/2024   SpO2 98%   BMI 62.88 kg/m²   OB Status Implant   Smoking Status Never   BSA 2.87 m²          DASI Risk Score      Flowsheet Row Pre-Admission Testing from 11/22/2024 in [facility] Pre-Admission Testing from 11/12/2024 in  [facility]   Can you take care of yourself (eat, dress, bathe, or use toilet)?  2.75 filed at 11/22/2024 1057 2.75 filed at 11/12/2024 1404   Can you walk indoors, such as around your house? 1.75 filed at 11/22/2024 1057 1.75 filed at 11/12/2024 1404   Can you walk a block or two on level ground?  2.75 filed at 11/22/2024 1057 2.75 filed at 11/12/2024 1404   Can you climb a flight of stairs or walk up a hill? 5.5 filed at 11/22/2024 1057 5.5 filed at 11/12/2024 1404   Can you run a short distance? 8 filed at 11/22/2024 1057 0 filed at 11/12/2024 1404   Can you do light work around the house like dusting or washing dishes? 2.7 filed at 11/22/2024 1057 2.7 filed at 11/12/2024 1404   Can you do moderate work around the house like " vacuuming, sweeping floors or carrying groceries? 3.5 filed at 11/22/2024 1057 3.5 filed at 11/12/2024 1404   Can you do heavy work around the house like scrubbing floors or lifting and moving heavy furniture?  8 filed at 11/22/2024 1057 8 filed at 11/12/2024 1404   Can you do yard work like raking leaves, weeding or pushing a mower? 4.5 filed at 11/22/2024 1057 4.5 filed at 11/12/2024 1404   Can you have sexual relations? 5.25 filed at 11/22/2024 1057 --   Can you participate in moderate recreational activities like golf, bowling, dancing, doubles tennis or throwing a baseball or football? 6 filed at 11/22/2024 1057 6 filed at 11/12/2024 1404   Can you participate in strenous sports like swimming, singles tennis, football, basketball, or skiing? 7.5 filed at 11/22/2024 1057 0 filed at 11/12/2024 1404   DASI SCORE 58.2 filed at 11/22/2024 1057 --   METS Score (Will be calculated only when all the questions are answered) 9.9 filed at 11/22/2024 1057 --          Caprini DVT Assessment      Flowsheet Row Pre-Admission Testing from 11/12/2024 in  [facility] Pre-Admission Testing from 2/22/2024 in  [facility]   DVT Score 5 filed at 11/12/2024 1208 5 filed at 02/22/2024 1007   Surgical Factors Minor surgery planned filed at 11/12/2024 1208 --   BMI Greater than 50 (Venous stasis syndrome) filed at 11/12/2024 1208 Greater than 50 (Venous stasis syndrome) filed at 02/22/2024 1007   RETIRED: Current Status -- Minor surgery planned filed at 02/22/2024 1007   RETIRED: Age -- 40-59 years filed at 02/22/2024 1007          Modified Frailty Index    No data to display       CHADS2 Stroke Risk  Current as of 11 minutes ago        N/A 3 to 100%: High Risk   2 to < 3%: Medium Risk   0 to < 2%: Low Risk     Last Change: N/A          This score determines the patient's risk of having a stroke if the patient has atrial fibrillation.        This score is not applicable to this patient. Components are not  calculated.          Revised Cardiac Risk Index      Flowsheet Row Pre-Admission Testing from 11/12/2024 in  [facility] Pre-Admission Testing from 2/22/2024 in  [facility]   High-Risk Surgery (Intraperitoneal, Intrathoracic,Suprainguinal vascular) 0 filed at 11/12/2024 1209 0 filed at 02/22/2024 1008   History of ischemic heart disease (History of MI, History of positive exercuse test, Current chest paint considered due to myocardial ischemia, Use of nitrate therapy, ECG with pathological Q Waves) 0 filed at 11/12/2024 1209 0 filed at 02/22/2024 1008   History of congestive heart failure (pulmonary edemia, bilateral rales or S3 gallop, Paroxysmal nocturnal dyspnea, CXR showing pulmonary vascular redistribution) 0 filed at 11/12/2024 1209 0 filed at 02/22/2024 1008   History of cerebrovascular disease (Prior TIA or stroke) 0 filed at 11/12/2024 1209 0 filed at 02/22/2024 1008   Pre-operative insulin treatment 0 filed at 11/12/2024 1209 0 filed at 02/22/2024 1008   Pre-operative creatinine>2 mg/dl 0 filed at 11/12/2024 1209 0 filed at 02/22/2024 1008   Revised Cardiac Risk Calculator 0 filed at 11/12/2024 1209 0 filed at 02/22/2024 1008          Apfel Simplified Score      Flowsheet Row Pre-Admission Testing from 11/12/2024 in  [facility] Pre-Admission Testing from 2/22/2024 in  [facility]   Smoking status 1 filed at 11/12/2024 1209 1 filed at 02/22/2024 1010   History of motion sickness or PONV  0 filed at 11/12/2024 1209 0 filed at 02/22/2024 1010   Use of postoperative opioids 0 filed at 11/12/2024 1209 0 filed at 02/22/2024 1010   Gender - Female 1=Yes filed at 11/12/2024 1209 --   Apfel Simplified Score Calculator 2 filed at 11/12/2024 1209 2 filed at 02/22/2024 1010          Risk Analysis Index Results This Encounter    No data found in the last 10 encounters.       Stop Bang Score      Flowsheet Row Pre-Admission Testing from 11/22/2024 in [facility] COLONOSCOPY in OR from 11/15/2024 in [facility] OR with Erwin Velazquez MD   Do you snore loudly? 1 filed at 11/22/2024 1056 1 filed at 11/15/2024 0653   Do you often feel tired or fatigued after your sleep? 0 filed at 11/22/2024 1056 0 filed at 11/15/2024 0653   Has anyone ever observed you stop breathing in your sleep? 0 filed at 11/22/2024 1056 0 filed at 11/15/2024 0653   Do you have or are you being treated for high blood pressure? 1 filed at 11/22/2024 1056 0 filed at 11/15/2024 0653   Recent BMI (Calculated) 62 filed at 11/22/2024 1056 54.9 filed at 11/15/2024 0653   Is BMI greater than 35 kg/m2? 1=Yes filed at 11/22/2024 1056 1=Yes filed at 11/15/2024 0653   Age older than 50 years old? 1=Yes filed at 11/22/2024 1056 1=Yes filed at 11/15/2024 0653   Is your neck circumference greater than 17 inches (Male) or 16 inches (Female)? 1 filed at 11/22/2024 1056 0 filed at 11/15/2024 0653   Gender - Male 0=No filed at 11/22/2024 1056 0=No filed at 11/15/2024 0653   STOP-BANG Total Score 5 filed at 11/22/2024 1056 3 filed at 11/15/2024 0653          Prodigy: High Risk  Total Score: 0          ARISCAT Score for Postoperative Pulmonary Complications    No data to display       Minaya Perioperative Risk for Myocardial Infarction or Cardiac Arrest (BUD)    No data to display       Recent Results (from the past 8 weeks)   Basic Metabolic Panel    Collection Time: 11/12/24  2:05 PM   Result Value Ref Range    Glucose 87 74 - 99 mg/dL    Sodium 137 136 - 145 mmol/L    Potassium 4.7 3.5 - 5.3 mmol/L    Chloride 102 98 - 107 mmol/L    Bicarbonate 28 21 - 32 mmol/L    Anion Gap 12 10 - 20 mmol/L    Urea Nitrogen 20 6 - 23 mg/dL    Creatinine 0.78 0.50 - 1.05 mg/dL    eGFR >90 >60 mL/min/1.73m*2    Calcium 9.2 8.6 - 10.3 mg/dL   CBC    Collection Time: 11/12/24  2:31 PM   Result Value Ref Range    WBC 8.4 4.4 - 11.3 x10*3/uL    nRBC 0.0 0.0 - 0.0 /100 WBCs    RBC 5.43 (H) 4.00 - 5.20 x10*6/uL     Hemoglobin 15.3 12.0 - 16.0 g/dL    Hematocrit 47.1 (H) 36.0 - 46.0 %    MCV 87 80 - 100 fL    MCH 28.2 26.0 - 34.0 pg    MCHC 32.5 32.0 - 36.0 g/dL    RDW 12.4 11.5 - 14.5 %    Platelets 295 150 - 450 x10*3/uL   ECG 12 lead    Collection Time: 11/15/24  7:20 AM   Result Value Ref Range    Ventricular Rate 68 BPM    Atrial Rate 68 BPM    AR Interval 170 ms    QRS Duration 104 ms    QT Interval 431 ms    QTC Calculation(Bazett) 459 ms    P Axis 17 degrees    R Axis -37 degrees    T Axis 43 degrees    QRS Count 11 beats    Q Onset 252 ms    T Offset 467 ms    QTC Fredericia 449 ms   POCT pregnancy, urine    Collection Time: 11/15/24  7:26 AM   Result Value Ref Range    Preg Test, Ur Negative Negative     Diagnostic Results    EKG  11/15/24  Sinus rhythm  Left axis deviation  Low voltage, precordial leads  Abnormal R-wave progression, late transition  Baseline wander in lead(s) I,III,aVL    Assessment and Plan:     Anesthesia  The patient denies problems with anesthesia in the past such as PONV, prolonged sedation, awareness, dental damage, aspiration, cardiac arrest, difficult intubation, or unexpected hospital admissions.     Cardiovascular  No diagnoses or significant findings on chart review or clinical presentation and evaluation.  She is scheduled for non-cardiac surgery associated with elevated risk. The patient has no major cardiac contraindications to non- cardiac surgery.  Cardiology Evaluation  The patient is not followed by cardiology.  METS  The patient's functional capacity capacity is greater than 4 METS.  RCRI  The patient meets 0 RCRI criteria and therefor has a 3.9% risk of major adverse cardiac complications.  BUD score which indicates a 0.1% risk of intraoperative or 30-day postoperative MACE (major adverse cardiac event).    Pulmonary  #Chronic bronchitis   -uses inhalers as needed. Denies any recent URIs, exacerbations or hospitalizations   The patient is at increased risk of perioperative  pulmonary complications secondary to morbid obesity.    STOP BANG 3, which places patient at intermediate risk for having ELVIS.    ARISCAT 3, low, 1.6% risk of in-hospital postoperative pulmonary complications  PRODIGY 0, low risk of respiratory depression episode.     Patient given PI sheet for preoperative deep breathing exercises.  Encourage  incentive spirometry in the postoperative period as deemed necessary.    Neurology  The patient has no neurological diagnoses or significant findings on chart review, clinical presentation, and evaluation. No grossly apparent neurological perioperative risk. The patient is at increased risk for perioperative stroke secondary to female gender, general anesthesia. Handouts for preoperative brain exercises given to patient.    HEENT/Airway  No diagnoses, significant findings on chart review, clinical presentation, or evaluation. The patient is at increased risk of airway difficulty secondary to obesity, short thick neck. No documented or reported history of airway difficulty.     Endocrine  No diagnoses or significant findings on chart review or clinical presentation and evaluation.    Gastrointestinal  No diagnoses or significant findings on chart review or clinical presentation and evaluation.    Eat 10- 0,  self-perceived oropharyngeal dysphagia scale (0-40)     Genitourinary  No diagnoses or significant findings on chart review or clinical presentation and evaluation.    Renal  No renal diagnoses or significant findings on chart review or clinical presentation and evaluation.    Hematology  No diagnoses or significant findings on chart review or clinical presentation and evaluation.    Caprini score 6, high risk of perioperative VTE.     Patient instructed to ambulate as soon as possible postoperatively to decrease thromboembolic risk. Initiate mechanical DVT prophylaxis as soon as possible and initiate chemical prophylaxis when deemed safe from a bleeding standpoint post  surgery.     Transfusion Evaluation  A type and screen was obtained given the likelihood for perioperative transfusion of blood or blood products.    Musculoskeletal  #RLS  -managed on requip, continue as prescribed in the perioperative period. No acute MS complaints    ID  No diagnoses or significant findings on chart review or clinical presentation and evaluation.    -Preoperative medication instructions were provided and reviewed with the patient.  Any additional testing or evaluation was explained to the patient.  NPO Instructions were discussed, and the patient's questions were answered prior to conclusion of this encounter. Patient verbalized understanding of preoperative instructions. After Visit Summary given.

## 2024-12-08 ENCOUNTER — ANESTHESIA EVENT (OUTPATIENT)
Dept: OPERATING ROOM | Facility: HOSPITAL | Age: 49
End: 2024-12-08
Payer: COMMERCIAL

## 2024-12-09 ENCOUNTER — HOSPITAL ENCOUNTER (OUTPATIENT)
Facility: HOSPITAL | Age: 49
Setting detail: OUTPATIENT SURGERY
Discharge: HOME | End: 2024-12-09
Attending: STUDENT IN AN ORGANIZED HEALTH CARE EDUCATION/TRAINING PROGRAM | Admitting: STUDENT IN AN ORGANIZED HEALTH CARE EDUCATION/TRAINING PROGRAM
Payer: COMMERCIAL

## 2024-12-09 ENCOUNTER — ANESTHESIA (OUTPATIENT)
Dept: OPERATING ROOM | Facility: HOSPITAL | Age: 49
End: 2024-12-09
Payer: COMMERCIAL

## 2024-12-09 VITALS
BODY MASS INDEX: 47.09 KG/M2 | DIASTOLIC BLOOD PRESSURE: 74 MMHG | OXYGEN SATURATION: 93 % | HEART RATE: 66 BPM | WEIGHT: 293 LBS | SYSTOLIC BLOOD PRESSURE: 144 MMHG | HEIGHT: 66 IN | TEMPERATURE: 98.2 F | RESPIRATION RATE: 14 BRPM

## 2024-12-09 DIAGNOSIS — N85.02 EIN (ENDOMETRIAL INTRAEPITHELIAL NEOPLASIA): ICD-10-CM

## 2024-12-09 DIAGNOSIS — Z71.3 WEIGHT LOSS COUNSELING, ENCOUNTER FOR: Primary | ICD-10-CM

## 2024-12-09 DIAGNOSIS — Z90.710 S/P LAPAROSCOPIC HYSTERECTOMY: ICD-10-CM

## 2024-12-09 PROBLEM — E05.90 HYPERTHYROIDISM: Status: ACTIVE | Noted: 2024-12-09

## 2024-12-09 LAB
ABO GROUP (TYPE) IN BLOOD: NORMAL
PREGNANCY TEST URINE, POC: NEGATIVE
RH FACTOR (ANTIGEN D): NORMAL

## 2024-12-09 PROCEDURE — 2500000004 HC RX 250 GENERAL PHARMACY W/ HCPCS (ALT 636 FOR OP/ED): Performed by: STUDENT IN AN ORGANIZED HEALTH CARE EDUCATION/TRAINING PROGRAM

## 2024-12-09 PROCEDURE — 2720000007 HC OR 272 NO HCPCS: Performed by: STUDENT IN AN ORGANIZED HEALTH CARE EDUCATION/TRAINING PROGRAM

## 2024-12-09 PROCEDURE — 88307 TISSUE EXAM BY PATHOLOGIST: CPT | Mod: TC,SUR | Performed by: STUDENT IN AN ORGANIZED HEALTH CARE EDUCATION/TRAINING PROGRAM

## 2024-12-09 PROCEDURE — 7100000001 HC RECOVERY ROOM TIME - INITIAL BASE CHARGE: Performed by: STUDENT IN AN ORGANIZED HEALTH CARE EDUCATION/TRAINING PROGRAM

## 2024-12-09 PROCEDURE — 3600000017 HC OR TIME - EACH INCREMENTAL 1 MINUTE - PROCEDURE LEVEL SIX: Performed by: STUDENT IN AN ORGANIZED HEALTH CARE EDUCATION/TRAINING PROGRAM

## 2024-12-09 PROCEDURE — A58571 PR LAPAROSCOPY W TOT HYSTERECTUTERUS <=250 GRAM  W TUBE/OVARY: Performed by: ANESTHESIOLOGY

## 2024-12-09 PROCEDURE — 2500000004 HC RX 250 GENERAL PHARMACY W/ HCPCS (ALT 636 FOR OP/ED)

## 2024-12-09 PROCEDURE — 2500000005 HC RX 250 GENERAL PHARMACY W/O HCPCS: Performed by: STUDENT IN AN ORGANIZED HEALTH CARE EDUCATION/TRAINING PROGRAM

## 2024-12-09 PROCEDURE — 58571 TLH W/T/O 250 G OR LESS: CPT | Performed by: STUDENT IN AN ORGANIZED HEALTH CARE EDUCATION/TRAINING PROGRAM

## 2024-12-09 PROCEDURE — 96372 THER/PROPH/DIAG INJ SC/IM: CPT | Performed by: STUDENT IN AN ORGANIZED HEALTH CARE EDUCATION/TRAINING PROGRAM

## 2024-12-09 PROCEDURE — 36415 COLL VENOUS BLD VENIPUNCTURE: CPT | Performed by: ANESTHESIOLOGY

## 2024-12-09 PROCEDURE — 3700000002 HC GENERAL ANESTHESIA TIME - EACH INCREMENTAL 1 MINUTE: Performed by: STUDENT IN AN ORGANIZED HEALTH CARE EDUCATION/TRAINING PROGRAM

## 2024-12-09 PROCEDURE — 7100000010 HC PHASE TWO TIME - EACH INCREMENTAL 1 MINUTE: Performed by: STUDENT IN AN ORGANIZED HEALTH CARE EDUCATION/TRAINING PROGRAM

## 2024-12-09 PROCEDURE — 3700000001 HC GENERAL ANESTHESIA TIME - INITIAL BASE CHARGE: Performed by: STUDENT IN AN ORGANIZED HEALTH CARE EDUCATION/TRAINING PROGRAM

## 2024-12-09 PROCEDURE — 88307 TISSUE EXAM BY PATHOLOGIST: CPT | Performed by: PATHOLOGY

## 2024-12-09 PROCEDURE — 7100000002 HC RECOVERY ROOM TIME - EACH INCREMENTAL 1 MINUTE: Performed by: STUDENT IN AN ORGANIZED HEALTH CARE EDUCATION/TRAINING PROGRAM

## 2024-12-09 PROCEDURE — 3600000018 HC OR TIME - INITIAL BASE CHARGE - PROCEDURE LEVEL SIX: Performed by: STUDENT IN AN ORGANIZED HEALTH CARE EDUCATION/TRAINING PROGRAM

## 2024-12-09 PROCEDURE — 2780000003 HC OR 278 NO HCPCS: Performed by: STUDENT IN AN ORGANIZED HEALTH CARE EDUCATION/TRAINING PROGRAM

## 2024-12-09 PROCEDURE — 7100000009 HC PHASE TWO TIME - INITIAL BASE CHARGE: Performed by: STUDENT IN AN ORGANIZED HEALTH CARE EDUCATION/TRAINING PROGRAM

## 2024-12-09 PROCEDURE — 2500000001 HC RX 250 WO HCPCS SELF ADMINISTERED DRUGS (ALT 637 FOR MEDICARE OP): Performed by: STUDENT IN AN ORGANIZED HEALTH CARE EDUCATION/TRAINING PROGRAM

## 2024-12-09 PROCEDURE — S2900 ROBOTIC SURGICAL SYSTEM: HCPCS | Performed by: STUDENT IN AN ORGANIZED HEALTH CARE EDUCATION/TRAINING PROGRAM

## 2024-12-09 PROCEDURE — 81025 URINE PREGNANCY TEST: CPT | Performed by: ANESTHESIOLOGY

## 2024-12-09 RX ORDER — POLYETHYLENE GLYCOL 3350 17 G/17G
17 POWDER, FOR SOLUTION ORAL DAILY PRN
Qty: 10 PACKET | Refills: 0 | Status: SHIPPED | OUTPATIENT
Start: 2024-12-09

## 2024-12-09 RX ORDER — GABAPENTIN 600 MG/1
600 TABLET ORAL ONCE
Status: COMPLETED | OUTPATIENT
Start: 2024-12-09 | End: 2024-12-09

## 2024-12-09 RX ORDER — ONDANSETRON 4 MG/1
4 TABLET, FILM COATED ORAL EVERY 6 HOURS PRN
Qty: 20 TABLET | Refills: 0 | Status: SHIPPED | OUTPATIENT
Start: 2024-12-09

## 2024-12-09 RX ORDER — IBUPROFEN 600 MG/1
600 TABLET ORAL EVERY 6 HOURS PRN
Qty: 50 TABLET | Refills: 0 | Status: SHIPPED | OUTPATIENT
Start: 2024-12-09

## 2024-12-09 RX ORDER — OXYCODONE HYDROCHLORIDE 5 MG/1
5 TABLET ORAL EVERY 6 HOURS PRN
Qty: 12 TABLET | Refills: 0 | Status: SHIPPED | OUTPATIENT
Start: 2024-12-09

## 2024-12-09 RX ORDER — HYDROMORPHONE HYDROCHLORIDE 1 MG/ML
INJECTION, SOLUTION INTRAMUSCULAR; INTRAVENOUS; SUBCUTANEOUS AS NEEDED
Status: DISCONTINUED | OUTPATIENT
Start: 2024-12-09 | End: 2024-12-09

## 2024-12-09 RX ORDER — ACETAMINOPHEN 325 MG/1
975 TABLET ORAL EVERY 6 HOURS PRN
Qty: 50 TABLET | Refills: 0 | Status: SHIPPED | OUTPATIENT
Start: 2024-12-09

## 2024-12-09 RX ORDER — ROCURONIUM BROMIDE 10 MG/ML
INJECTION, SOLUTION INTRAVENOUS AS NEEDED
Status: DISCONTINUED | OUTPATIENT
Start: 2024-12-09 | End: 2024-12-09

## 2024-12-09 RX ORDER — CELECOXIB 200 MG/1
400 CAPSULE ORAL ONCE
Status: COMPLETED | OUTPATIENT
Start: 2024-12-09 | End: 2024-12-09

## 2024-12-09 RX ORDER — ONDANSETRON HYDROCHLORIDE 2 MG/ML
4 INJECTION, SOLUTION INTRAVENOUS ONCE AS NEEDED
Status: DISCONTINUED | OUTPATIENT
Start: 2024-12-09 | End: 2024-12-11 | Stop reason: HOSPADM

## 2024-12-09 RX ORDER — BUPIVACAINE HYDROCHLORIDE 5 MG/ML
INJECTION, SOLUTION PERINEURAL AS NEEDED
Status: DISCONTINUED | OUTPATIENT
Start: 2024-12-09 | End: 2024-12-09 | Stop reason: HOSPADM

## 2024-12-09 RX ORDER — OXYCODONE HYDROCHLORIDE 5 MG/1
10 TABLET ORAL EVERY 4 HOURS PRN
Status: DISCONTINUED | OUTPATIENT
Start: 2024-12-09 | End: 2024-12-11 | Stop reason: HOSPADM

## 2024-12-09 RX ORDER — FENTANYL CITRATE 50 UG/ML
INJECTION, SOLUTION INTRAMUSCULAR; INTRAVENOUS AS NEEDED
Status: DISCONTINUED | OUTPATIENT
Start: 2024-12-09 | End: 2024-12-09

## 2024-12-09 RX ORDER — METRONIDAZOLE 500 MG/100ML
500 INJECTION, SOLUTION INTRAVENOUS ONCE
Status: COMPLETED | OUTPATIENT
Start: 2024-12-09 | End: 2024-12-09

## 2024-12-09 RX ORDER — PHENYLEPHRINE HCL IN 0.9% NACL 0.4MG/10ML
SYRINGE (ML) INTRAVENOUS AS NEEDED
Status: DISCONTINUED | OUTPATIENT
Start: 2024-12-09 | End: 2024-12-09

## 2024-12-09 RX ORDER — KETOROLAC TROMETHAMINE 30 MG/ML
INJECTION, SOLUTION INTRAMUSCULAR; INTRAVENOUS AS NEEDED
Status: DISCONTINUED | OUTPATIENT
Start: 2024-12-09 | End: 2024-12-09

## 2024-12-09 RX ORDER — MIDAZOLAM HYDROCHLORIDE 1 MG/ML
INJECTION INTRAMUSCULAR; INTRAVENOUS AS NEEDED
Status: DISCONTINUED | OUTPATIENT
Start: 2024-12-09 | End: 2024-12-09

## 2024-12-09 RX ORDER — LIDOCAINE HYDROCHLORIDE 20 MG/ML
INJECTION, SOLUTION INFILTRATION; PERINEURAL AS NEEDED
Status: DISCONTINUED | OUTPATIENT
Start: 2024-12-09 | End: 2024-12-09

## 2024-12-09 RX ORDER — HYDROMORPHONE HYDROCHLORIDE 0.2 MG/ML
0.2 INJECTION INTRAMUSCULAR; INTRAVENOUS; SUBCUTANEOUS EVERY 5 MIN PRN
Status: DISCONTINUED | OUTPATIENT
Start: 2024-12-09 | End: 2024-12-11 | Stop reason: HOSPADM

## 2024-12-09 RX ORDER — ACETAMINOPHEN 325 MG/1
975 TABLET ORAL ONCE
Status: COMPLETED | OUTPATIENT
Start: 2024-12-09 | End: 2024-12-09

## 2024-12-09 RX ORDER — HEPARIN SODIUM 5000 [USP'U]/ML
5000 INJECTION, SOLUTION INTRAVENOUS; SUBCUTANEOUS ONCE
Status: COMPLETED | OUTPATIENT
Start: 2024-12-09 | End: 2024-12-09

## 2024-12-09 RX ORDER — OXYCODONE HYDROCHLORIDE 5 MG/1
5 TABLET ORAL EVERY 4 HOURS PRN
Status: DISCONTINUED | OUTPATIENT
Start: 2024-12-09 | End: 2024-12-11 | Stop reason: HOSPADM

## 2024-12-09 RX ORDER — WATER 1 ML/ML
IRRIGANT IRRIGATION AS NEEDED
Status: DISCONTINUED | OUTPATIENT
Start: 2024-12-09 | End: 2024-12-09 | Stop reason: HOSPADM

## 2024-12-09 RX ORDER — SODIUM CHLORIDE 0.9 G/100ML
IRRIGANT IRRIGATION AS NEEDED
Status: DISCONTINUED | OUTPATIENT
Start: 2024-12-09 | End: 2024-12-09 | Stop reason: HOSPADM

## 2024-12-09 RX ORDER — LIDOCAINE HYDROCHLORIDE 10 MG/ML
0.1 INJECTION, SOLUTION EPIDURAL; INFILTRATION; INTRACAUDAL; PERINEURAL ONCE
Status: DISCONTINUED | OUTPATIENT
Start: 2024-12-09 | End: 2024-12-11 | Stop reason: HOSPADM

## 2024-12-09 RX ORDER — DROPERIDOL 2.5 MG/ML
0.62 INJECTION, SOLUTION INTRAMUSCULAR; INTRAVENOUS ONCE AS NEEDED
Status: DISCONTINUED | OUTPATIENT
Start: 2024-12-09 | End: 2024-12-11 | Stop reason: HOSPADM

## 2024-12-09 RX ORDER — ACETAMINOPHEN 325 MG/1
650 TABLET ORAL EVERY 4 HOURS PRN
Status: DISCONTINUED | OUTPATIENT
Start: 2024-12-09 | End: 2024-12-11 | Stop reason: HOSPADM

## 2024-12-09 RX ORDER — CEFAZOLIN 1 G/1
INJECTION, POWDER, FOR SOLUTION INTRAVENOUS AS NEEDED
Status: DISCONTINUED | OUTPATIENT
Start: 2024-12-09 | End: 2024-12-09

## 2024-12-09 RX ORDER — ONDANSETRON HYDROCHLORIDE 2 MG/ML
INJECTION, SOLUTION INTRAVENOUS AS NEEDED
Status: DISCONTINUED | OUTPATIENT
Start: 2024-12-09 | End: 2024-12-09

## 2024-12-09 RX ORDER — LABETALOL HYDROCHLORIDE 5 MG/ML
5 INJECTION, SOLUTION INTRAVENOUS ONCE AS NEEDED
Status: DISCONTINUED | OUTPATIENT
Start: 2024-12-09 | End: 2024-12-11 | Stop reason: HOSPADM

## 2024-12-09 RX ORDER — PROPOFOL 10 MG/ML
INJECTION, EMULSION INTRAVENOUS AS NEEDED
Status: DISCONTINUED | OUTPATIENT
Start: 2024-12-09 | End: 2024-12-09

## 2024-12-09 RX ORDER — CLINDAMYCIN PHOSPHATE 900 MG/50ML
900 INJECTION, SOLUTION INTRAVENOUS ONCE
Status: DISCONTINUED | OUTPATIENT
Start: 2024-12-09 | End: 2024-12-09 | Stop reason: HOSPADM

## 2024-12-09 RX ORDER — HYDRALAZINE HYDROCHLORIDE 20 MG/ML
5 INJECTION INTRAMUSCULAR; INTRAVENOUS EVERY 30 MIN PRN
Status: DISCONTINUED | OUTPATIENT
Start: 2024-12-09 | End: 2024-12-11 | Stop reason: HOSPADM

## 2024-12-09 SDOH — HEALTH STABILITY: MENTAL HEALTH: CURRENT SMOKER: 0

## 2024-12-09 ASSESSMENT — PAIN SCALES - GENERAL
PAINLEVEL_OUTOF10: 1
PAIN_LEVEL: 5
PAINLEVEL_OUTOF10: 0 - NO PAIN
PAINLEVEL_OUTOF10: 3
PAINLEVEL_OUTOF10: 0 - NO PAIN
PAINLEVEL_OUTOF10: 9
PAINLEVEL_OUTOF10: 5 - MODERATE PAIN
PAINLEVEL_OUTOF10: 0 - NO PAIN
PAINLEVEL_OUTOF10: 3

## 2024-12-09 ASSESSMENT — PAIN - FUNCTIONAL ASSESSMENT
PAIN_FUNCTIONAL_ASSESSMENT: 0-10

## 2024-12-09 NOTE — HOSPITAL COURSE
Gynecologic Surgery History and Physical     Carolina Murrell is a 49 y.o. with EIN presenting for robotic assisted hysterectomy, bilateral salpingectomy, any indicated procedures.    EIN hx  - initially identified in 1/2024 on EMB  -  D+C 5/2024 w/ normal pathology and placement of Mirena IUD  - EMB was 7/2024 and showed no e/o persistent hyperplasia     Imaging:   - Pelvic US 1/2024 showed uterus measuring 8cm x 5cm x 5cm. EMS 11mm, otherwise unremarkable.   - Pelvic US 10/2024 uterus 8 x 4 x5cm. Endometrium 7mm, IUD in situ    Labs (11/12): hgb 15.3, plts 295, Cr 0.78     Screening:   - Last pap 11/2021 negative/negative, pap 1/2024 w/ insufficient cytology but negative HPV, repeat 4/2024 w/ negative cytology, no hx of CIN2-3  - Last mammogram 2/2024 BIRADS 1    PMHx: HLD, chronic bronchitis, restless leg syndrome (gabapentin)   PSHx: laparoscopic BTL, nasal surgery, ankle surgery, D+C     Meds:   - prn albuterol  - symbicort 2 puffs bid  - gabapentin 100mg at bedtime  - ropinirole 2mg qhs    Allergies   Allergen Reactions    Penicillins Anaphylaxis, Itching and Swelling    Sulfa (Sulfonamide Antibiotics) Fever, Hives, Nausea Only and Other     Objective  There were no vitals filed for this visit.     Physical exam:  General: no acute distress  HEENT: normocephalic, atraumatic  CV: warm and well perfused  Lungs: breathing comfortably on room air  Abdomen: soft, non-tender  Extremities: moving all extremities spontaneously  Neuro: awake and conversant  Psych: appropriate mood and affect      Assessment & Plan   Carolina Murrell is a 49 y.o. with EIN presenting for robotic assisted hysterectomy, bilateral salpingectomy, any indicated procedures.    Seen and d/w Dr. Susan Arellano MD   Gynecology [id number]

## 2024-12-09 NOTE — ANESTHESIA PROCEDURE NOTES
Peripheral IV  Date/Time: 12/9/2024 8:30 AM      Placement  Needle size: 18 G  Laterality: left  Location: hand  Local anesthetic: none  Site prep: alcohol  Technique: anatomical landmarks  Attempts: 2

## 2024-12-09 NOTE — H&P
Gynecologic Surgery History and Physical     Carolina Murrell is a 49 y.o. with EIN presenting for robotic assisted hysterectomy, bilateral salpingectomy, any indicated procedures.    EIN hx  - initially identified in 1/2024 on EMB  -  D+C 5/2024 w/ normal pathology and placement of Mirena IUD  - EMB was 7/2024 and showed no e/o persistent hyperplasia     Imaging:   - Pelvic US 1/2024 showed uterus measuring 8cm x 5cm x 5cm. EMS 11mm, otherwise unremarkable.   - Pelvic US 10/2024 uterus 8 x 4 x5cm. Endometrium 7mm, IUD in situ    Labs (11/12): hgb 15.3, plts 295, Cr 0.78     Screening:   - Last pap 11/2021 negative/negative, pap 1/2024 w/ insufficient cytology but negative HPV, repeat 4/2024 w/ negative cytology, no hx of CIN2-3  - Last mammogram 2/2024 BIRADS 1    PMHx: HLD, chronic bronchitis, restless leg syndrome (gabapentin)   PSHx: laparoscopic BTL, nasal surgery, ankle surgery, D+C     Meds:   - prn albuterol  - symbicort 2 puffs bid  - gabapentin 100mg at bedtime  - ropinirole 2mg qhs    Allergies   Allergen Reactions    Penicillins Anaphylaxis, Itching and Swelling    Sulfa (Sulfonamide Antibiotics) Fever, Hives, Nausea Only and Other     Objective    Vitals:    12/09/24 0631   BP: (!) 131/96   Pulse: 82   Resp: 16   Temp: 36.9 °C (98.4 °F)   SpO2: 96%         Physical exam:  General: no acute distress  HEENT: normocephalic, atraumatic  CV: warm and well perfused  Lungs: breathing comfortably on room air  Abdomen: soft, non-tender  Extremities: moving all extremities spontaneously  Neuro: awake and conversant  Psych: appropriate mood and affect      Assessment & Plan   Carolina Murrell is a 49 y.o. with EIN presenting for robotic assisted hysterectomy, bilateral salpingectomy, any indicated procedures.    Seen and d/w Dr. Susan Arellano MD   Gynecology [id number]

## 2024-12-09 NOTE — ANESTHESIA PREPROCEDURE EVALUATION
Patient: Carolina Murrell    Procedure Information       Date/Time: 12/09/24 0645    Procedure: Total robotic hysterectomy, bilateral salpingectomy, any indicated procedure (Bilateral: Pelvis)    Location: [facility] OR  / [facility] OR    Surgeons: Nakita Davis MD            Relevant Problems   Cardiac   (+) Essential hypertension   (+) Hypercholesterolemia      Endocrine   (+) BMI 60.0-69.9, adult (Multi)   (+) Class 3 severe obesity in adult      HEENT   (+) Acute non-recurrent frontal sinusitis      GYN   (+) Abnormal uterine bleeding (AUB)       Clinical information reviewed:    Allergies  Meds     OB Status           NPO Detail:  NPO/Void Status  Date of Last Liquid: 12/09/24  Time of Last Liquid: 0000  Date of Last Solid: 12/08/24  Time of Last Solid: 1700  Last Intake Type: Clear fluids  Time of Last Void: 0600         Physical Exam    Airway  Mallampati: I  TM distance: >3 FB  Neck ROM: full     Cardiovascular - normal exam     Dental - normal exam     Pulmonary - normal exam     Abdominal   (+) obese             Anesthesia Plan    History of general anesthesia?: yes  History of complications of general anesthesia?: no    ASA 3     general     The patient is not a current smoker.  Patient did not smoke on day of procedure.    intravenous induction   Postoperative administration of opioids is intended.  Trial extubation is planned.  Anesthetic plan and risks discussed with patient.  Use of blood products discussed with patient and spouse who consented to blood products.    Plan discussed with attending.

## 2024-12-09 NOTE — ANESTHESIA POSTPROCEDURE EVALUATION
Patient: Carolina Murrell    Procedure Summary       Date: 12/09/24 Room / Location: [facility] OR 05 / [facility] OR    Anesthesia Start: 0747 Anesthesia Stop: 1057    Procedure: Total robotic hysterectomy, bilateral salpingectomy, and cystoscopy (Bilateral: Pelvis) Diagnosis:       EIN (endometrial intraepithelial neoplasia)      (EIN (endometrial intraepithelial neoplasia) [N85.02])    Surgeons: Nakita Davis MD Responsible Provider: Juana Aviles MD    Anesthesia Type: general ASA Status: 2            Anesthesia Type: general    Vitals Value Taken Time   /68 12/09/24 1101   Temp 36.1C 12/09/24 1109   Pulse 61 12/09/24 1105   Resp 15 12/09/24 1105   SpO2 100 % 12/09/24 1105   Vitals shown include unfiled device data.    Anesthesia Post Evaluation    Patient location during evaluation: PACU  Patient participation: complete - patient participated  Level of consciousness: awake and sleepy but conscious  Pain score: 5  Pain management: adequate  Multimodal analgesia pain management approach  Airway patency: patent  Cardiovascular status: acceptable, blood pressure returned to baseline and hemodynamically stable  Respiratory status: acceptable, airway suctioned, face mask and nasal airway  Hydration status: acceptable  Postoperative Nausea and Vomiting: none        No notable events documented.

## 2024-12-09 NOTE — ANESTHESIA PROCEDURE NOTES
Airway  Date/Time: 12/9/2024 8:03 AM  Urgency: elective    Airway not difficult    Staffing  Performed: resident   Authorized by: Juana Aviles MD    Performed by: Kay Patricia MD  Patient location during procedure: OR    Indications and Patient Condition  Indications for airway management: anesthesia and airway protection  Spontaneous ventilation: present  Sedation level: deep  Preoxygenated: yes  Patient position: sniffing  Mask difficulty assessment: 2 - vent by mask + OA or adjuvant +/- NMBA  Planned trial extubation    Final Airway Details  Final airway type: endotracheal airway      Successful airway: ETT  Cuffed: yes   Successful intubation technique: video laryngoscopy  Facilitating devices/methods: intubating stylet and cricoid pressure  Endotracheal tube insertion site: oral  Blade: Jairo  Blade size: #3  ETT size (mm): 7.0  Cormack-Lehane Classification: grade I - full view of glottis  Placement verified by: capnometry   Measured from: lips  ETT to lips (cm): 22  Number of attempts at approach: 1  Number of other approaches attempted: 0

## 2024-12-09 NOTE — OP NOTE
Total robotic hysterectomy, bilateral salpingectomy, any indicated procedure (B) Operative Note     Date: 2024  OR Location: [facility] OR    Name: Carolina Murrell, : 1975, Age: 49 y.o., MRN: [id number], Sex: female    Diagnosis  Pre-op Diagnosis      * EIN (endometrial intraepithelial neoplasia) [N85.02] Post-op Diagnosis     * EIN (endometrial intraepithelial neoplasia) [N85.02]     Procedures  Total robotic hysterectomy, bilateral salpingectomy, any indicated procedure  04679 - MD LAPAROSCOPY TOTAL HYSTERECTOMY UTERUS >250 GM      Surgeons      * Nakita Davis - Primary    Resident/Fellow/Other Assistant:  Surgeons and Role:     * Nakita Arellano MD - Resident - Assisting     * Yarely Chase MD - Resident - Assisting     * Neva Jordan MD MPH - Resident - Assisting    Staff:   Circulator: Thu Duckworth Person: Kasey    Anesthesia Staff: Anesthesiologist: Juana Aviles MD  C-AA: BRENDA Evans  Anesthesia Resident: Kay Patricia MD    Procedure Summary  Anesthesia: General  ASA: II  Estimated Blood Loss: 25 mL  Intra-op Medications:   Administrations occurring from 0645 to 1010 on 24:   Medication Name Total Dose   surgical lubricant gel 1 Application   sodium chloride 0.9 % irrigation solution 500 mL   sterile water irrigation solution 700 mL   ceFAZolin (Ancef) vial 1 g 3 g   dexAMETHasone (Decadron) 4 mg/mL 4 mg   fentaNYL (Sublimaze) injection 50 mcg/mL 100 mcg   HYDROmorphone (Dilaudid) injection 1 mg/mL 0.2 mg   LR bolus Cannot be calculated   lidocaine (Xylocaine) injection 2 % 100 mg   midazolam PF (Versed) injection 1 mg/mL 2 mg   ondansetron 2 mg/mL 4 mg   phenylephrine 40 mcg/mL syringe 10 mL 80 mcg   propofol (Diprivan) injection 10 mg/mL 200 mg   rocuronium (ZeMuron) 50 mg/5 mL injection 130 mg   acetaminophen (Tylenol) tablet 975 mg 975 mg   celecoxib (CeleBREX) capsule 400 mg 400 mg   gabapentin (Neurontin) tablet 600 mg 600 mg   heparin (porcine) injection  5,000 Units 5,000 Units   metroNIDAZOLE (Flagyl) 500 mg in sodium chloride (iso)  mL 500 mg              Anesthesia Record               Intraprocedure I/O Totals          Output    Urine 180 mL    Total Output 180 mL          Specimen:   ID Type Source Tests Collected by Time   1 : UTERUS, CERVIX AND BILATERAL FALLOPIAN TUBES Tissue UTERUS, CERVIX, AND BILATERAL FALLOPIAN TUBES SURGICAL PATHOLOGY EXAM Nakita Davis MD 12/9/2024 0928                 Drains and/or Catheters:   Urethral Catheter Non-latex 16 Fr. (Active)     Indications: Carolina Murrell is an 49 y.o. female who is having surgery for EIN (endometrial intraepithelial neoplasia) [N85.02].     Findings  Grossly normal upper abdominal and pelvic anatomy w/ exception of significant intra-abdominal adiposity.     Description of Procedure  Patient was taken to the operating room where she was prepared and draped in the usual sterile fashion.  A TipHiveare uterine manipulator was placed.  A Gupta catheter was placed. Attention was then turned abdominally.  The base of the umbilicus was elevated using Kocher clamps.  The skin was incised with a scalpel.  The fascia was grasped with Kochers and entered sharply using a scalpel.  Peritoneum was bluntly opened using a Angela clamp.  Intraperitoneal placement was confirmed via visualization of underlying bowel.     Hiral trocar was then placed at the umbilical entry site. Diagnostic laparoscopy was performed, and revealed findings as noted above.  No areas of trauma or injury were noted immediately inferior to the umbilicus. Complete abdominal survey was performed. The patient was then placed in steep Trendelenburg positioning.     Ancillary trocars were then placed under direct visualization. Three 8mm trocars were placed, two in the LLQ and one in the RLQ. The robot was then docked.     Attention was turned to the pelvis. The right fallopian tube was elevated away from the underlying structures.  The  mesosalpinx was clamped, sealed, and transected using the VesselSealer device.  The fallopian tube was  to the level of the uterine cornua.  The round ligament and utero-ovarian ligament were then clamped sealed and transected using the VesselSealer device.  The broad ligament was then opened anteriorly and posteriorly to skeletonize the uterine vasculature.  The bladder flap was started using the VesselSealer device.  The uterine vasculature was then clamped sealed and transected using the VesselSealer device.  It was lateralized using the VesselSealer device.      Attention was then turned to the left side of the pelvis, which was dissected in a similar fashion.     Monopolar electrosurgery was then utilized to complete the bladder flap.  The bladder was dissected away from the lower uterine segment and cervix using blunt dissection and monopolar electrosurgery.  At this time care was taken to ensure that the bladder was well out of the operative field as well as the bilateral uterine pedicles. Colpotomy was then performed using monopolar electrosurgery.  The uterus was fully  from the vagina. The uterus, cervix, and bilateral Fallopian tubes were then removed through the vagina.     The pelvis was inspected and noted be adequately hemostatic.  The vaginal cuff was then reapproximated laparoscopically using 0 V lock suture. Hemostasis was achieved using monopolar electrosurgery.       The umbilical fascia was then closed with 0 Vicryl suture.  The skin was closed with 4-0 Monocryl.        The Gupta catheter was removed. Cystoscopy was performed and revealed intact bladder with bilateral ureteral jets and no evidence of trauma or foreign material.      The patient was awakened from general anesthesia and taken the recovery room in stable condition.      I was present and scrubbed for the entirety of the surgical procedure. This was procedure was substantially more complicated and required increase  time and surgical expertise due to patient's body habitus.     Complications:  None; patient tolerated the procedure well.    Disposition: PACU - hemodynamically stable.  Condition: stable         Nakita Davis  Phone Number: [phone]

## 2025-01-06 ENCOUNTER — TELEPHONE (OUTPATIENT)
Dept: OBSTETRICS AND GYNECOLOGY | Facility: CLINIC | Age: 50
End: 2025-01-06
Payer: COMMERCIAL

## 2025-01-06 DIAGNOSIS — Z12.31 ENCOUNTER FOR SCREENING MAMMOGRAM FOR MALIGNANT NEOPLASM OF BREAST: ICD-10-CM

## 2025-01-06 NOTE — TELEPHONE ENCOUNTER
Endometrial biopsy with JACKELYN 7/10/2024  Appointment with ALDEAN 4/10/2024, referred to Susan for Endometrial hyerplasia.  Total hysterectomy done 12/09/2024 with Dr. Davis.   Mammogram pended.

## 2025-01-13 NOTE — PROGRESS NOTES
"Division of Minimally Invasive Gynecologic Surgery  [facility]    Date: 1/15/24 - Gynecology Visit    Carolina Murrell is a 49 y.o. status post TRH-BS on 12/9/24 presents for post op check.     Overall recovering well, meeting all milestones.     PMHx, PSHx, SHx, Allergies, and Medications updated in Epic.    ROS: reviewed and negative    PE:/62   Ht 1.676 m (5' 6\")   Wt (!) 182 kg (402 lb)   LMP 04/01/2024   BMI 64.88 kg/m²    Constitutional:  No acute distress  HEENT: EOM grossly intact, MMM, neck supple and with full ROM  Pulm:  Effort normal. No accessory muscle usage.  No respiratory distress.  Abd: soft, non-distended, non-tender, no palpable masses, incisions c/d/i  :  - EGBUS: grossly WNL  - Speculum: vaginal mucosa grossly WNL, no trauma or lesions, cuff intact w/o laxity   Neurological:  AAO x 3, appropriate to interview  Skin: Warm, no pallor.  Psychiatric:  normal mood and affect.    Assessment/Plan:     Carolina Murrell is a 49 y.o. status post TRH-BS on 12/9/24 presents for post op check.   - Recovering well, no concerns  - Path reviewed, benign, notable only for small fibroid   - No indication for further pap smears  - Continue post op restrictions until 6 weeks after surgery, reviewed these today    Seen in combination w/ DARELL Leahy MD  Division of Minimally Invasive Gynecologic Surgery  [facility]    "

## 2025-01-14 ENCOUNTER — APPOINTMENT (OUTPATIENT)
Dept: OBSTETRICS AND GYNECOLOGY | Facility: CLINIC | Age: 50
End: 2025-01-14
Payer: COMMERCIAL

## 2025-01-15 ENCOUNTER — APPOINTMENT (OUTPATIENT)
Dept: OBSTETRICS AND GYNECOLOGY | Facility: CLINIC | Age: 50
End: 2025-01-15
Payer: COMMERCIAL

## 2025-01-15 VITALS
HEIGHT: 66 IN | SYSTOLIC BLOOD PRESSURE: 126 MMHG | BODY MASS INDEX: 47.09 KG/M2 | DIASTOLIC BLOOD PRESSURE: 62 MMHG | WEIGHT: 293 LBS

## 2025-01-15 DIAGNOSIS — Z48.89 POSTOPERATIVE VISIT: Primary | ICD-10-CM

## 2025-01-15 PROCEDURE — 99024 POSTOP FOLLOW-UP VISIT: CPT | Performed by: STUDENT IN AN ORGANIZED HEALTH CARE EDUCATION/TRAINING PROGRAM

## 2025-01-15 PROCEDURE — 3078F DIAST BP <80 MM HG: CPT | Performed by: STUDENT IN AN ORGANIZED HEALTH CARE EDUCATION/TRAINING PROGRAM

## 2025-01-15 PROCEDURE — 3008F BODY MASS INDEX DOCD: CPT | Performed by: STUDENT IN AN ORGANIZED HEALTH CARE EDUCATION/TRAINING PROGRAM

## 2025-01-15 PROCEDURE — 3074F SYST BP LT 130 MM HG: CPT | Performed by: STUDENT IN AN ORGANIZED HEALTH CARE EDUCATION/TRAINING PROGRAM

## 2025-01-15 SDOH — ECONOMIC STABILITY: TRANSPORTATION INSECURITY
IN THE PAST 12 MONTHS, HAS THE LACK OF TRANSPORTATION KEPT YOU FROM MEDICAL APPOINTMENTS OR FROM GETTING MEDICATIONS?: NO

## 2025-01-15 SDOH — ECONOMIC STABILITY: INCOME INSECURITY: IN THE LAST 12 MONTHS, WAS THERE A TIME WHEN YOU WERE NOT ABLE TO PAY THE MORTGAGE OR RENT ON TIME?: NO

## 2025-01-15 SDOH — HEALTH STABILITY: PHYSICAL HEALTH: ON AVERAGE, HOW MANY DAYS PER WEEK DO YOU ENGAGE IN MODERATE TO STRENUOUS EXERCISE (LIKE A BRISK WALK)?: 7 DAYS

## 2025-01-15 SDOH — ECONOMIC STABILITY: FOOD INSECURITY: WITHIN THE PAST 12 MONTHS, THE FOOD YOU BOUGHT JUST DIDN'T LAST AND YOU DIDN'T HAVE MONEY TO GET MORE.: NEVER TRUE

## 2025-01-15 SDOH — ECONOMIC STABILITY: FOOD INSECURITY: WITHIN THE PAST 12 MONTHS, YOU WORRIED THAT YOUR FOOD WOULD RUN OUT BEFORE YOU GOT MONEY TO BUY MORE.: NEVER TRUE

## 2025-01-15 SDOH — HEALTH STABILITY: PHYSICAL HEALTH: ON AVERAGE, HOW MANY MINUTES DO YOU ENGAGE IN EXERCISE AT THIS LEVEL?: 40 MIN

## 2025-01-15 SDOH — ECONOMIC STABILITY: TRANSPORTATION INSECURITY
IN THE PAST 12 MONTHS, HAS LACK OF TRANSPORTATION KEPT YOU FROM MEETINGS, WORK, OR FROM GETTING THINGS NEEDED FOR DAILY LIVING?: NO

## 2025-01-15 ASSESSMENT — LIFESTYLE VARIABLES
HOW OFTEN DO YOU HAVE SIX OR MORE DRINKS ON ONE OCCASION: NEVER
HOW OFTEN DO YOU HAVE A DRINK CONTAINING ALCOHOL: MONTHLY OR LESS
AUDIT-C TOTAL SCORE: 1
SKIP TO QUESTIONS 9-10: 1
HOW MANY STANDARD DRINKS CONTAINING ALCOHOL DO YOU HAVE ON A TYPICAL DAY: 1 OR 2

## 2025-01-15 ASSESSMENT — PAIN SCALES - GENERAL: PAINLEVEL_OUTOF10: 0-NO PAIN

## 2025-01-15 NOTE — LETTER
January 15, 2025     Patient: Carolina Murrell   YOB: 1975   Date of Visit: 1/15/2025       To Whom It May Concern:    It is my medical opinion that Carolina Murrell may return to work on 1/21/2025, with two weeks of part time work if needed to be determined by the patient . Due to abdominal surgery, mild deconditioning has occurred. Otherwise no restrictions.     If you have any questions or concerns, please don't hesitate to call.         Sincerely,        Gaviota Peraza CNP  Minimally Invasive GYN Surgery  Under the direction of Dr. Nakita Davis- surgeon     CC: No Recipients

## 2025-01-29 ENCOUNTER — APPOINTMENT (OUTPATIENT)
Dept: PRIMARY CARE | Facility: CLINIC | Age: 50
End: 2025-01-29
Payer: COMMERCIAL

## 2025-01-29 VITALS
OXYGEN SATURATION: 96 % | TEMPERATURE: 97.5 F | WEIGHT: 293 LBS | BODY MASS INDEX: 64.79 KG/M2 | SYSTOLIC BLOOD PRESSURE: 126 MMHG | HEART RATE: 77 BPM | DIASTOLIC BLOOD PRESSURE: 82 MMHG

## 2025-01-29 DIAGNOSIS — Z13.6 SCREENING FOR CARDIOVASCULAR CONDITION: ICD-10-CM

## 2025-01-29 DIAGNOSIS — G25.81 RESTLESS LEG SYNDROME: Primary | ICD-10-CM

## 2025-01-29 DIAGNOSIS — J30.1 ALLERGIC RHINITIS DUE TO POLLEN, UNSPECIFIED SEASONALITY: ICD-10-CM

## 2025-01-29 DIAGNOSIS — E66.813 CLASS 3 SEVERE OBESITY DUE TO EXCESS CALORIES WITH SERIOUS COMORBIDITY AND BODY MASS INDEX (BMI) OF 60.0 TO 69.9 IN ADULT: ICD-10-CM

## 2025-01-29 DIAGNOSIS — E66.01 CLASS 3 SEVERE OBESITY DUE TO EXCESS CALORIES WITH SERIOUS COMORBIDITY AND BODY MASS INDEX (BMI) OF 60.0 TO 69.9 IN ADULT: ICD-10-CM

## 2025-01-29 PROCEDURE — 3074F SYST BP LT 130 MM HG: CPT | Performed by: INTERNAL MEDICINE

## 2025-01-29 PROCEDURE — G2211 COMPLEX E/M VISIT ADD ON: HCPCS | Performed by: INTERNAL MEDICINE

## 2025-01-29 PROCEDURE — 3079F DIAST BP 80-89 MM HG: CPT | Performed by: INTERNAL MEDICINE

## 2025-01-29 PROCEDURE — 99214 OFFICE O/P EST MOD 30 MIN: CPT | Performed by: INTERNAL MEDICINE

## 2025-01-29 RX ORDER — ALBUTEROL SULFATE 90 UG/1
INHALANT RESPIRATORY (INHALATION)
Qty: 54 G | Refills: 1 | Status: SHIPPED | OUTPATIENT
Start: 2025-01-29

## 2025-01-29 RX ORDER — FLUTICASONE PROPIONATE 50 MCG
2 SPRAY, SUSPENSION (ML) NASAL DAILY
Qty: 144 G | Refills: 1 | Status: SHIPPED | OUTPATIENT
Start: 2025-01-29

## 2025-01-29 RX ORDER — ROPINIROLE 2 MG/1
2 TABLET, FILM COATED ORAL NIGHTLY
Qty: 90 TABLET | Refills: 1 | Status: SHIPPED | OUTPATIENT
Start: 2025-01-29

## 2025-01-29 RX ORDER — GABAPENTIN 100 MG/1
100 CAPSULE ORAL NIGHTLY
Qty: 90 CAPSULE | Refills: 1 | Status: SHIPPED | OUTPATIENT
Start: 2025-01-29

## 2025-01-29 RX ORDER — MONTELUKAST SODIUM 10 MG/1
10 TABLET ORAL DAILY
Qty: 90 TABLET | Refills: 1 | Status: SHIPPED | OUTPATIENT
Start: 2025-01-29

## 2025-01-29 ASSESSMENT — ENCOUNTER SYMPTOMS
ENDOCRINE NEGATIVE: 1
GASTROINTESTINAL NEGATIVE: 1
PSYCHIATRIC NEGATIVE: 1
HEMATOLOGIC/LYMPHATIC NEGATIVE: 1
EYES NEGATIVE: 1
RESPIRATORY NEGATIVE: 1
NEUROLOGICAL NEGATIVE: 1
MUSCULOSKELETAL NEGATIVE: 1
CONSTITUTIONAL NEGATIVE: 1
CARDIOVASCULAR NEGATIVE: 1
ALLERGIC/IMMUNOLOGIC NEGATIVE: 1

## 2025-01-29 ASSESSMENT — PATIENT HEALTH QUESTIONNAIRE - PHQ9
2. FEELING DOWN, DEPRESSED OR HOPELESS: NOT AT ALL
SUM OF ALL RESPONSES TO PHQ9 QUESTIONS 1 AND 2: 0
1. LITTLE INTEREST OR PLEASURE IN DOING THINGS: NOT AT ALL

## 2025-01-29 NOTE — PROGRESS NOTES
Subjective   Patient ID: Carolina Murrell is a 49 y.o. female who presents for 6 month follow up.  Patient presents today in follow up re:   allergic rhinitis and restless legs.    Rhinitis Sx have been overall stable on current medical therapy.  No adverse effects of therapy.    Restless legs have not been as well controlled with therapy of late.  She has tried taking a second ropinirole with modestly good effect.  No adverse effects of therapy reported.        Review of Systems   Constitutional: Negative.    HENT: Negative.     Eyes: Negative.    Respiratory: Negative.     Cardiovascular: Negative.    Gastrointestinal: Negative.    Endocrine: Negative.    Genitourinary: Negative.    Musculoskeletal: Negative.    Skin: Negative.    Allergic/Immunologic: Negative.    Neurological: Negative.    Hematological: Negative.    Psychiatric/Behavioral: Negative.         Objective     Blood pressure 126/82, pulse 77, temperature 36.4 °C (97.5 °F), temperature source Temporal, weight (!) 182 kg (401 lb 6.4 oz), last menstrual period 04/01/2024, SpO2 96%.   Physical Exam  Vitals reviewed.   Constitutional:       Appearance: Normal appearance. She is obese.   Neck:      Vascular: No carotid bruit.   Cardiovascular:      Rate and Rhythm: Normal rate and regular rhythm.      Pulses: Normal pulses.      Heart sounds: Normal heart sounds. No murmur heard.  Pulmonary:      Effort: Pulmonary effort is normal.      Breath sounds: Normal breath sounds. No wheezing or rales.   Abdominal:      General: Abdomen is flat. There is no distension.      Palpations: Abdomen is soft.      Tenderness: There is no abdominal tenderness.   Musculoskeletal:         General: Normal range of motion.      Cervical back: Normal range of motion and neck supple.   Skin:     General: Skin is warm and dry.   Neurological:      General: No focal deficit present.      Mental Status: She is alert and oriented to person, place, and time.   Psychiatric:          Mood and Affect: Mood normal.         Behavior: Behavior normal.         Assessment/Plan   Problem List Items Addressed This Visit       Allergic rhinitis    Relevant Medications    montelukast (Singulair) 10 mg tablet    fluticasone (Flonase) 50 mcg/actuation nasal spray    albuterol 90 mcg/actuation inhaler    Restless leg syndrome - Primary    Relevant Medications    rOPINIRole (Requip) 2 mg tablet    gabapentin (Neurontin) 100 mg capsule    Class 3 severe obesity in adult    Relevant Orders    TSH with reflex to Free T4 if abnormal     Other Visit Diagnoses       Screening for cardiovascular condition        Relevant Orders    Lipid Panel    Comprehensive Metabolic Panel          Rhinitis Sx well compensated on current therapy.  Will continue present therapy and follow.  Restless legs Sx overall well compensated on current therapy.  Will continue present therapy, and the addition of gabapentin has been helpful such that she does not need the gabapentin every night.  Will continue present therapy and follow.  Of note, she reports that her Sx have lessened significantly since having hysterectomy.  Annual mammogram ordered by Gyn.  Pt. advised on improving dietary choices and portion control as well as increasing exercise to promote weight loss.  [facility] and she states that she made multiple phone calls without getting her call answered and she got no return call after leaving messages.  We tried to help get her access at prior OV's without success.  It is noted that she had lost 28# on her own previously, but over the last 6 months, she has regained 18#.  She was given info for [facility] at last OV.  She is due for annual screening labs which are ordered to be done a/t soon.      Follow up in 6 months  Lab to be drawn 1 week prior to next office visit.

## 2025-02-13 ENCOUNTER — APPOINTMENT (OUTPATIENT)
Dept: AUDIOLOGY | Facility: CLINIC | Age: 50
End: 2025-02-13
Payer: COMMERCIAL

## 2025-02-13 ENCOUNTER — APPOINTMENT (OUTPATIENT)
Dept: OTOLARYNGOLOGY | Facility: CLINIC | Age: 50
End: 2025-02-13
Payer: COMMERCIAL

## 2025-02-13 VITALS — BODY MASS INDEX: 47.09 KG/M2 | WEIGHT: 293 LBS | HEIGHT: 66 IN

## 2025-02-13 DIAGNOSIS — J01.00 ACUTE NON-RECURRENT MAXILLARY SINUSITIS: Primary | ICD-10-CM

## 2025-02-13 DIAGNOSIS — J30.89 NON-SEASONAL ALLERGIC RHINITIS DUE TO OTHER ALLERGIC TRIGGER: ICD-10-CM

## 2025-02-13 DIAGNOSIS — H60.8X3 CHRONIC ECZEMATOUS OTITIS EXTERNA OF BOTH EARS: ICD-10-CM

## 2025-02-13 DIAGNOSIS — H69.93 DYSFUNCTION OF BOTH EUSTACHIAN TUBES: ICD-10-CM

## 2025-02-13 DIAGNOSIS — J30.1 ALLERGIC RHINITIS DUE TO POLLEN, UNSPECIFIED SEASONALITY: ICD-10-CM

## 2025-02-13 DIAGNOSIS — H61.23 BILATERAL IMPACTED CERUMEN: ICD-10-CM

## 2025-02-13 PROCEDURE — 99214 OFFICE O/P EST MOD 30 MIN: CPT | Performed by: OTOLARYNGOLOGY

## 2025-02-13 PROCEDURE — 3008F BODY MASS INDEX DOCD: CPT | Performed by: OTOLARYNGOLOGY

## 2025-02-13 PROCEDURE — 69210 REMOVE IMPACTED EAR WAX UNI: CPT | Performed by: OTOLARYNGOLOGY

## 2025-02-13 PROCEDURE — 1036F TOBACCO NON-USER: CPT | Performed by: OTOLARYNGOLOGY

## 2025-02-13 RX ORDER — FLUOCINOLONE ACETONIDE 0.11 MG/ML
OIL AURICULAR (OTIC)
Qty: 20 ML | Refills: 3 | Status: SHIPPED | OUTPATIENT
Start: 2025-02-13

## 2025-02-13 RX ORDER — AZELASTINE 1 MG/ML
2 SPRAY, METERED NASAL 2 TIMES DAILY
Qty: 90 ML | Refills: 1 | Status: SHIPPED | OUTPATIENT
Start: 2025-02-13

## 2025-02-13 RX ORDER — AZITHROMYCIN 250 MG/1
TABLET, FILM COATED ORAL
Qty: 6 TABLET | Refills: 0 | Status: SHIPPED | OUTPATIENT
Start: 2025-02-13 | End: 2025-02-18

## 2025-02-13 RX ORDER — FLUTICASONE PROPIONATE 50 MCG
2 SPRAY, SUSPENSION (ML) NASAL DAILY
Qty: 144 G | Refills: 1 | Status: SHIPPED | OUTPATIENT
Start: 2025-02-13

## 2025-02-13 NOTE — PROGRESS NOTES
Subjective   Patient ID: Carolina Murrell is a 49 y.o. female  HPI  Patient is complaining of congestion and fullness in the right.  She is also complaining of pressure in her cheek on the left side.  She has been using Flonase and Astelin for her chronic allergies.  Review of Systems    Objective   Physical Exam  There is cerumen impaction bilaterally and this was cleared using speculum and curettes.  There is dark fungal debris in the ear canal on the left side and this was also debrided and suctioned under binocular magnification.  The the tympanic membranes are retracted but they are moving with a Valsalva maneuver.  With the patient's permission the left ear canal was treated with topical boric acid.  There is nasal edema and the turbinates are pale.  There is purulence noted in the upper nasal passage on the left side and there is tenderness with percussion of the maxillary sinus on the left side.    Ear cerumen removal    Date/Time: 2/13/2025 9:22 AM    Performed by: Blayne Mcclellan MD  Authorized by: Blayne Mcclellan MD    Consent:     Consent obtained:  Verbal    Risks discussed:  Pain  Procedure details:     Location:  L ear and R ear    Procedure type: curette        Assessment/Plan   Diagnoses and all orders for this visit:  Acute non-recurrent maxillary sinusitis (Primary)  -     azithromycin (Zithromax Z-Gulshan) 250 mg tablet; Take 2 tabs (500 mg) by mouth today, then 1 tab (250 mg) daily for 4 days.  Non-seasonal allergic rhinitis due to other allergic trigger  -     azelastine (Astelin) 137 mcg (0.1 %) nasal spray; Administer 2 sprays into each nostril 2 times a day.  Dysfunction of both eustachian tubes  Chronic eczematous otitis externa of both ears  -     fluocinolone (DermOtic) 0.01 % ear drops; Instill 5 drops in the affected ear once a day as needed for itching  Bilateral impacted cerumen  -     Ear cerumen removal  Allergic rhinitis due to pollen, unspecified seasonality  -     fluticasone (Flonase)  50 mcg/actuation nasal spray; Administer 2 sprays into each nostril once daily.       1.  Left mycotic otitis externa.  The ear canal was debrided and she was treated with topical boric acid.  2.  Chronic allergic rhinitis controlled with Flonase and Astelin nasal sprays.  3.  Chronic bilateral eustachian tube dysfunction controlled with the Valsalva maneuver as needed.  4.  Chronic eczema of external ear canals controlled with DermOtic drops and mometasone cream.  5.  Acute maxillary sinusitis on the left side.  The patient was started on a 5-day course of Zithromax.  She will follow-up in 2 weeks.

## 2025-02-14 LAB
ALBUMIN SERPL-MCNC: 4 G/DL (ref 3.6–5.1)
ALP SERPL-CCNC: 84 U/L (ref 37–153)
ALT SERPL-CCNC: 13 U/L (ref 6–29)
ANION GAP SERPL CALCULATED.4IONS-SCNC: 9 MMOL/L (CALC) (ref 7–17)
AST SERPL-CCNC: 16 U/L (ref 10–35)
BILIRUB SERPL-MCNC: 0.6 MG/DL (ref 0.2–1.2)
BUN SERPL-MCNC: 16 MG/DL (ref 7–25)
CALCIUM SERPL-MCNC: 9 MG/DL (ref 8.6–10.4)
CHLORIDE SERPL-SCNC: 104 MMOL/L (ref 98–110)
CHOLEST SERPL-MCNC: 202 MG/DL
CHOLEST/HDLC SERPL: 4.1 (CALC)
CO2 SERPL-SCNC: 27 MMOL/L (ref 20–32)
CREAT SERPL-MCNC: 0.63 MG/DL (ref 0.5–1.03)
EGFRCR SERPLBLD CKD-EPI 2021: 107 ML/MIN/1.73M2
GLUCOSE SERPL-MCNC: 92 MG/DL (ref 65–99)
HDLC SERPL-MCNC: 49 MG/DL
LDLC SERPL CALC-MCNC: 131 MG/DL (CALC)
NONHDLC SERPL-MCNC: 153 MG/DL (CALC)
POTASSIUM SERPL-SCNC: 4.8 MMOL/L (ref 3.5–5.3)
PROT SERPL-MCNC: 6.8 G/DL (ref 6.1–8.1)
SODIUM SERPL-SCNC: 140 MMOL/L (ref 135–146)
TRIGL SERPL-MCNC: 113 MG/DL
TSH SERPL-ACNC: 1.75 MIU/L

## 2025-02-17 ENCOUNTER — APPOINTMENT (OUTPATIENT)
Dept: RADIOLOGY | Facility: HOSPITAL | Age: 50
End: 2025-02-17
Payer: COMMERCIAL

## 2025-03-14 ENCOUNTER — TELEPHONE (OUTPATIENT)
Dept: PRIMARY CARE | Facility: CLINIC | Age: 50
End: 2025-03-14
Payer: COMMERCIAL

## 2025-03-14 NOTE — TELEPHONE ENCOUNTER
Carolina called because her restless leg syndrome is worse now.  The jerking at night is waiting her up.  She wanted to know if she could increase the Ropinirole 2 mg.  She is taking 1 tablet at bedtime. Please advise.

## 2025-04-15 DIAGNOSIS — M25.521 RIGHT ELBOW PAIN: ICD-10-CM

## 2025-04-16 ENCOUNTER — OFFICE VISIT (OUTPATIENT)
Dept: ORTHOPEDIC SURGERY | Facility: CLINIC | Age: 50
End: 2025-04-16
Payer: COMMERCIAL

## 2025-04-16 ENCOUNTER — HOSPITAL ENCOUNTER (OUTPATIENT)
Dept: RADIOLOGY | Facility: CLINIC | Age: 50
Discharge: HOME | End: 2025-04-16
Payer: COMMERCIAL

## 2025-04-16 VITALS — HEIGHT: 66 IN | WEIGHT: 293 LBS | BODY MASS INDEX: 47.09 KG/M2

## 2025-04-16 DIAGNOSIS — M77.11 LATERAL EPICONDYLITIS, RIGHT ELBOW: Primary | ICD-10-CM

## 2025-04-16 DIAGNOSIS — M25.521 RIGHT ELBOW PAIN: ICD-10-CM

## 2025-04-16 PROCEDURE — 99204 OFFICE O/P NEW MOD 45 MIN: CPT | Performed by: ORTHOPAEDIC SURGERY

## 2025-04-16 PROCEDURE — 1036F TOBACCO NON-USER: CPT | Performed by: ORTHOPAEDIC SURGERY

## 2025-04-16 PROCEDURE — 3008F BODY MASS INDEX DOCD: CPT | Performed by: ORTHOPAEDIC SURGERY

## 2025-04-16 PROCEDURE — L3908 WHO COCK-UP NONMOLDE PRE OTS: HCPCS | Performed by: ORTHOPAEDIC SURGERY

## 2025-04-16 PROCEDURE — 20551 NJX 1 TENDON ORIGIN/INSJ: CPT | Performed by: ORTHOPAEDIC SURGERY

## 2025-04-16 PROCEDURE — 73080 X-RAY EXAM OF ELBOW: CPT | Mod: RT

## 2025-04-16 PROCEDURE — 73080 X-RAY EXAM OF ELBOW: CPT | Mod: RIGHT SIDE | Performed by: RADIOLOGY

## 2025-04-16 RX ORDER — MELOXICAM 15 MG/1
15 TABLET ORAL DAILY
Qty: 30 TABLET | Refills: 3 | Status: SHIPPED | OUTPATIENT
Start: 2025-04-16 | End: 2025-08-14

## 2025-04-16 RX ORDER — LIDOCAINE HYDROCHLORIDE 10 MG/ML
1 INJECTION, SOLUTION INFILTRATION; PERINEURAL
Status: COMPLETED | OUTPATIENT
Start: 2025-04-16 | End: 2025-04-16

## 2025-04-16 RX ADMIN — LIDOCAINE HYDROCHLORIDE 1 ML: 10 INJECTION, SOLUTION INFILTRATION; PERINEURAL at 15:31

## 2025-04-16 ASSESSMENT — ENCOUNTER SYMPTOMS
LOSS OF SENSATION IN FEET: 0
DEPRESSION: 0
OCCASIONAL FEELINGS OF UNSTEADINESS: 0

## 2025-04-16 ASSESSMENT — PAIN SCALES - GENERAL: PAINLEVEL_OUTOF10: 1

## 2025-04-16 ASSESSMENT — PAIN - FUNCTIONAL ASSESSMENT: PAIN_FUNCTIONAL_ASSESSMENT: 0-10

## 2025-04-16 NOTE — PROGRESS NOTES
NPV: Carolina Murrell is coming in with right elbow problems for the past 2 weeks. Pain is all around the elbow. Pain worse with grasping and pick things up. No Hx of trauma, Sx, or injections to the area. Reports numbness/tingling in the shoulder and arm. Denies associated neck pain. No diabetes. XR done today.  Tylenol, Gabapentin, and Ibuprofen PRN. No PT/OT or EMG.

## 2025-04-16 NOTE — PROGRESS NOTES
49 y.o. female presents today for evaluation of right lateral sided elbow pain. The patient reports gradual onset of worsening pain over the past couple of months.  Pain is controlled. Patient reports no numbness and tingling.  Reports no previous surgeries, injections, or trauma to the area.  Reports pain worse with use, better at rest.   Pain dull ache, sharp at times.  Worse picking things up.      Review of Systems    Constitutional: see HPI, no fever, no chills, not feeling tired, no significant weight gain or weight loss.   Eyes: No vision changes  ENT: no nosebleeds.   Cardiovascular: no chest pain.   Respiratory: no shortness of breath and no cough.   Gastrointestinal: no abdominal pain, no nausea, no vomiting and no diarrhea.   Musculoskeletal: per HPI  Neurological: no headache, no gait disturbances  Psychiatric: no depression and no sleep disturbances.   Endocrine: no muscle weakness and no muscle cramps.   Hematologic/Lymphatic: no swollen glands and no tendency for easy bruising or excessive swelling.     Patient's past medical history, past surgical history, allergies, and medications have been reviewed unless otherwise noted in the chart.     Lateral Epicondylitis  Inspection:  no evidence of infection, no erythema, no edema, Palpation:  compartments are soft, positive pain over the lateral epicondyle, Range of Motion:  full elbow motion, Stability:  no elbow instability noted, Strength:  5/5 elbow flexion/extension, Skin:  intact, Vascular:  capillary refill <2 seconds distally, Sensation:  intact to light touch distally, Tests:  pain with resisted wrist extension over lateral epicondyle.      Constitutional   General appearance: Alert and in no acute distress. Well developed, well nourished.    Eyes   External Eye, Conjunctiva and lids: Normal external exam - pupils were equal in size, round, reactive to light (PERRL) with normal accommodation and extraocular movements intact (EOMI).   Ears, Nose,  Mouth, and Throat   Hearing: Normal.   Neck   Neck: No neck mass was observed. Supple.   Pulmonary   Respiratory effort: No respiratory distress.   Cardiovascular   Examination of extremities: No peripheral edema.   Psychiatric   Judgment and insight: Intact.   Orientation to person, place, and time: Alert and oriented x 3.       Mood and affect: Normal.      XR - no fractures, no dislocations, or no osseous abnormalities right elbow    X-rays were personally reviewed by me. Radiology reports were reviewed by me as well, if available at the time.      Right lateral epicondylitis  Based on the history, physical exam and imaging studies above, the patient's presentation is consistent with the above diagnosis.  I had a long discussion with the patient regarding their presentation and the treatment options.  We discussed initial nonoperative versus operative management options as well as potential further diagnostic imaging.  We again discussed her treatment options going forward along with their associated risks and benefits. After thorough discussion, the patient has elected to proceed with conservative management. All questions were answered to the patients satisfaction who seems satisfied with the plan.  They will call the office with any questions/concerns.    Discussion I discussed the diagnosis and treatment options with the patient today along with their associated risks and benefits. After thorough discussion, the patient has elected to proceed with a corticosteroid injection with Kenalog and Xylocaine, which was performed in the office today under aseptic technique and the patient tolerated the procedure well.          Ortho Injections:           Injection site right lateral epicondyle. Medication 1 cc 1% Xylocaine, 1 cc 10 mg Kenalog, Injection given under sterile conditions. No immediate complications noted. Post injection instructions given.   Cock up wrist splint  Mobic  Hand master plus  Follow-up 8 weeks  as needed no x-ray    Patient ID: Carolina Murrell is a 49 y.o. female.    Hand / UE Inj/Asp: R elbow for lateral epicondylitis on 4/16/2025 3:31 PM  Indications: pain  Details: 25 G needle, lateral approach  Medications: 1 mL lidocaine 10 mg/mL (1 %); 10 mg triamcinolone acetonide 10 mg/mL  Outcome: tolerated well, no immediate complications  Procedure, treatment alternatives, risks and benefits explained, specific risks discussed. Consent was given by the patient. Immediately prior to procedure a time out was called to verify the correct patient, procedure, equipment, support staff and site/side marked as required. Patient was prepped and draped in the usual sterile fashion.

## 2025-06-19 ENCOUNTER — TELEPHONE (OUTPATIENT)
Dept: PRIMARY CARE | Facility: CLINIC | Age: 50
End: 2025-06-19
Payer: COMMERCIAL

## 2025-06-19 DIAGNOSIS — G25.81 RESTLESS LEG SYNDROME: ICD-10-CM

## 2025-06-19 RX ORDER — ROPINIROLE 3 MG/1
3 TABLET, FILM COATED ORAL NIGHTLY
Qty: 90 TABLET | Refills: 0 | Status: SHIPPED | OUTPATIENT
Start: 2025-06-19

## 2025-06-19 NOTE — TELEPHONE ENCOUNTER
Patient is requesting a refill on:    rOPINIRole (Requip) 2 mg tablet     Route: Take 1 tablet (2 mg) by mouth once daily at bedtime.     [facility]     Last office visit:  8/23/2024    Next office visit:  7/28/2025    Patient stated she had been taking 1 1/2 after calling Dr Burton in March about her restless legs getting worse.     It is why she needed an earlier refill.

## 2025-06-19 NOTE — TELEPHONE ENCOUNTER
Called and Left message on machine that the med was sent in with a dose change and call if any questions

## 2025-06-25 ENCOUNTER — APPOINTMENT (OUTPATIENT)
Dept: ORTHOPEDIC SURGERY | Facility: CLINIC | Age: 50
End: 2025-06-25
Payer: COMMERCIAL

## 2025-06-25 VITALS — BODY MASS INDEX: 47.09 KG/M2 | WEIGHT: 293 LBS | HEIGHT: 66 IN

## 2025-06-25 DIAGNOSIS — M77.11 LATERAL EPICONDYLITIS, RIGHT ELBOW: ICD-10-CM

## 2025-06-25 PROCEDURE — 3008F BODY MASS INDEX DOCD: CPT | Performed by: ORTHOPAEDIC SURGERY

## 2025-06-25 PROCEDURE — 99214 OFFICE O/P EST MOD 30 MIN: CPT | Performed by: ORTHOPAEDIC SURGERY

## 2025-06-25 PROCEDURE — 20551 NJX 1 TENDON ORIGIN/INSJ: CPT | Performed by: ORTHOPAEDIC SURGERY

## 2025-06-25 PROCEDURE — 1036F TOBACCO NON-USER: CPT | Performed by: ORTHOPAEDIC SURGERY

## 2025-06-25 RX ORDER — MELOXICAM 15 MG/1
15 TABLET ORAL DAILY
Qty: 90 TABLET | Refills: 1 | Status: SHIPPED | OUTPATIENT
Start: 2025-06-25 | End: 2025-10-23

## 2025-06-25 RX ORDER — LIDOCAINE HYDROCHLORIDE 10 MG/ML
1 INJECTION, SOLUTION INFILTRATION; PERINEURAL
Status: COMPLETED | OUTPATIENT
Start: 2025-06-25 | End: 2025-06-25

## 2025-06-25 RX ADMIN — LIDOCAINE HYDROCHLORIDE 1 ML: 10 INJECTION, SOLUTION INFILTRATION; PERINEURAL at 08:51

## 2025-06-25 ASSESSMENT — ENCOUNTER SYMPTOMS
LOSS OF SENSATION IN FEET: 0
OCCASIONAL FEELINGS OF UNSTEADINESS: 0
DEPRESSION: 0

## 2025-06-25 ASSESSMENT — PAIN - FUNCTIONAL ASSESSMENT: PAIN_FUNCTIONAL_ASSESSMENT: 0-10

## 2025-06-25 ASSESSMENT — PAIN SCALES - GENERAL: PAINLEVEL_OUTOF10: 6

## 2025-06-25 NOTE — PROGRESS NOTES
49 y.o. female presents today for follow up of right lateral sided elbow pain. The patient reports gradual onset of worsening pain over the past couple of months.  Pain is controlled. Patient reports no numbness and tingling.  Reports no previous surgeries or trauma to the area.  Reports pain worse with use, better at rest.   Pain dull ache, sharp at times.  Worse picking things up.  We did a shot about 2 months ago that helped for a few weeks but symptoms have returned.  Would like to try another.    Review of Systems    Constitutional: see HPI, no fever, no chills, not feeling tired, no significant weight gain or weight loss.   Eyes: No vision changes  ENT: no nosebleeds.   Cardiovascular: no chest pain.   Respiratory: no shortness of breath and no cough.   Gastrointestinal: no abdominal pain, no nausea, no vomiting and no diarrhea.   Musculoskeletal: per HPI  Neurological: no headache, no gait disturbances  Psychiatric: no depression and no sleep disturbances.   Endocrine: no muscle weakness and no muscle cramps.   Hematologic/Lymphatic: no swollen glands and no tendency for easy bruising or excessive swelling.     Patient's past medical history, past surgical history, allergies, and medications have been reviewed unless otherwise noted in the chart.     Lateral Epicondylitis  Inspection:  no evidence of infection, no erythema, no edema, Palpation:  compartments are soft, positive pain over the lateral epicondyle, Range of Motion:  full elbow motion, Stability:  no elbow instability noted, Strength:  5/5 elbow flexion/extension, Skin:  intact, Vascular:  capillary refill <2 seconds distally, Sensation:  intact to light touch distally, Tests:  pain with resisted wrist extension over lateral epicondyle.      Constitutional   General appearance: Alert and in no acute distress. Well developed, well nourished.    Eyes   External Eye, Conjunctiva and lids: Normal external exam - pupils were equal in size, round,  reactive to light (PERRL) with normal accommodation and extraocular movements intact (EOMI).   Ears, Nose, Mouth, and Throat   Hearing: Normal.   Neck   Neck: No neck mass was observed. Supple.   Pulmonary   Respiratory effort: No respiratory distress.   Cardiovascular   Examination of extremities: No peripheral edema.   Psychiatric   Judgment and insight: Intact.   Orientation to person, place, and time: Alert and oriented x 3.       Mood and affect: Normal.      XR - no fractures, no dislocations, or no osseous abnormalities right elbow    X-rays were personally reviewed by me. Radiology reports were reviewed by me as well, if available at the time.      Right lateral epicondylitis  Based on the history, physical exam and imaging studies above, the patient's presentation is consistent with the above diagnosis.  I had a long discussion with the patient regarding their presentation and the treatment options.  We discussed initial nonoperative versus operative management options as well as potential further diagnostic imaging.  We again discussed her treatment options going forward along with their associated risks and benefits. After thorough discussion, the patient has elected to proceed with conservative management. All questions were answered to the patients satisfaction who seems satisfied with the plan.  They will call the office with any questions/concerns.    Discussion I discussed the diagnosis and treatment options with the patient today along with their associated risks and benefits. After thorough discussion, the patient has elected to proceed with a corticosteroid injection with Kenalog and Xylocaine, which was performed in the office today under aseptic technique and the patient tolerated the procedure well.          Ortho Injections:           Injection site right lateral epicondyle. Medication 1 cc 1% Xylocaine, 1 cc 10 mg Kenalog, Injection given under sterile conditions. No immediate complications  noted. Post injection instructions given.   Cock up wrist splint  Mobic  Hand master plus  Follow-up 8 weeks as needed no x-ray    Patient ID: Carolina Murrell is a 49 y.o. female.    Hand / UE Inj/Asp: R elbow for lateral epicondylitis on 6/25/2025 8:51 AM  Indications: pain  Details: 25 G needle, lateral approach  Medications: 1 mL lidocaine 10 mg/mL (1 %); 10 mg triamcinolone acetonide 10 mg/mL  Outcome: tolerated well, no immediate complications  Procedure, treatment alternatives, risks and benefits explained, specific risks discussed. Consent was given by the patient. Immediately prior to procedure a time out was called to verify the correct patient, procedure, equipment, support staff and site/side marked as required. Patient was prepped and draped in the usual sterile fashion.

## 2025-07-28 ENCOUNTER — APPOINTMENT (OUTPATIENT)
Dept: PRIMARY CARE | Facility: CLINIC | Age: 50
End: 2025-07-28
Payer: COMMERCIAL

## 2025-07-28 VITALS
DIASTOLIC BLOOD PRESSURE: 88 MMHG | WEIGHT: 293 LBS | OXYGEN SATURATION: 97 % | TEMPERATURE: 97.4 F | SYSTOLIC BLOOD PRESSURE: 130 MMHG | HEART RATE: 77 BPM | BODY MASS INDEX: 66.66 KG/M2

## 2025-07-28 DIAGNOSIS — J30.1 ALLERGIC RHINITIS DUE TO POLLEN, UNSPECIFIED SEASONALITY: ICD-10-CM

## 2025-07-28 DIAGNOSIS — G25.81 RESTLESS LEG SYNDROME: Primary | ICD-10-CM

## 2025-07-28 DIAGNOSIS — E66.813 CLASS 3 SEVERE OBESITY DUE TO EXCESS CALORIES WITH SERIOUS COMORBIDITY AND BODY MASS INDEX (BMI) OF 60.0 TO 69.9 IN ADULT: ICD-10-CM

## 2025-07-28 PROCEDURE — 1036F TOBACCO NON-USER: CPT | Performed by: INTERNAL MEDICINE

## 2025-07-28 PROCEDURE — 3079F DIAST BP 80-89 MM HG: CPT | Performed by: INTERNAL MEDICINE

## 2025-07-28 PROCEDURE — 3075F SYST BP GE 130 - 139MM HG: CPT | Performed by: INTERNAL MEDICINE

## 2025-07-28 PROCEDURE — 99214 OFFICE O/P EST MOD 30 MIN: CPT | Performed by: INTERNAL MEDICINE

## 2025-07-28 RX ORDER — ROPINIROLE 3 MG/1
3 TABLET, FILM COATED ORAL NIGHTLY
Qty: 90 TABLET | Refills: 1 | Status: SHIPPED | OUTPATIENT
Start: 2025-07-28

## 2025-07-28 RX ORDER — GABAPENTIN 100 MG/1
100 CAPSULE ORAL NIGHTLY
Qty: 90 CAPSULE | Refills: 1 | Status: SHIPPED | OUTPATIENT
Start: 2025-07-28

## 2025-07-28 RX ORDER — ALBUTEROL SULFATE 90 UG/1
INHALANT RESPIRATORY (INHALATION)
Qty: 54 G | Refills: 1 | Status: SHIPPED | OUTPATIENT
Start: 2025-07-28

## 2025-07-28 RX ORDER — MONTELUKAST SODIUM 10 MG/1
10 TABLET ORAL DAILY
Qty: 90 TABLET | Refills: 1 | Status: SHIPPED | OUTPATIENT
Start: 2025-07-28

## 2025-07-28 ASSESSMENT — PATIENT HEALTH QUESTIONNAIRE - PHQ9
SUM OF ALL RESPONSES TO PHQ9 QUESTIONS 1 AND 2: 0
2. FEELING DOWN, DEPRESSED OR HOPELESS: NOT AT ALL
1. LITTLE INTEREST OR PLEASURE IN DOING THINGS: NOT AT ALL

## 2025-07-28 ASSESSMENT — ENCOUNTER SYMPTOMS
HEMATOLOGIC/LYMPHATIC NEGATIVE: 1
MUSCULOSKELETAL NEGATIVE: 1
NEUROLOGICAL NEGATIVE: 1
GASTROINTESTINAL NEGATIVE: 1
CARDIOVASCULAR NEGATIVE: 1
RESPIRATORY NEGATIVE: 1
CONSTITUTIONAL NEGATIVE: 1
PSYCHIATRIC NEGATIVE: 1
ALLERGIC/IMMUNOLOGIC NEGATIVE: 1
ENDOCRINE NEGATIVE: 1
EYES NEGATIVE: 1

## 2025-07-28 NOTE — PROGRESS NOTES
Subjective   Patient ID: Carolina Murrell is a 49 y.o. female who presents for 6 month follow up .  Patient presents today in follow up re:   allergic rhinitis and restless legs.    Rhinitis Sx have been overall stable on current medical therapy.  No adverse effects of therapy.    Restless legs have not been as well controlled with therapy of late.  She has tried taking a second ropinirole with modestly good effect.  No adverse effects of therapy reported.        Review of Systems   Constitutional: Negative.    HENT: Negative.     Eyes: Negative.    Respiratory: Negative.     Cardiovascular: Negative.    Gastrointestinal: Negative.    Endocrine: Negative.    Genitourinary: Negative.    Musculoskeletal: Negative.    Skin: Negative.    Allergic/Immunologic: Negative.    Neurological: Negative.    Hematological: Negative.    Psychiatric/Behavioral: Negative.         Objective     Blood pressure 130/88, pulse 77, temperature 36.3 °C (97.4 °F), temperature source Temporal, weight (!) 187 kg (413 lb), last menstrual period 04/01/2024, SpO2 97%.   Physical Exam  Vitals reviewed.   Constitutional:       Appearance: Normal appearance. She is obese.   Neck:      Vascular: No carotid bruit.     Cardiovascular:      Rate and Rhythm: Normal rate and regular rhythm.      Pulses: Normal pulses.      Heart sounds: Normal heart sounds. No murmur heard.  Pulmonary:      Effort: Pulmonary effort is normal.      Breath sounds: Normal breath sounds. No wheezing or rales.   Abdominal:      General: Abdomen is flat. There is no distension.      Palpations: Abdomen is soft.      Tenderness: There is no abdominal tenderness.     Musculoskeletal:         General: Normal range of motion.      Cervical back: Normal range of motion and neck supple.     Skin:     General: Skin is warm and dry.     Neurological:      General: No focal deficit present.      Mental Status: She is alert and oriented to person, place, and time.     Psychiatric:          Mood and Affect: Mood normal.         Behavior: Behavior normal.         Assessment/Plan   Problem List Items Addressed This Visit       Allergic rhinitis    Relevant Medications    montelukast (Singulair) 10 mg tablet    albuterol 90 mcg/actuation inhaler    Restless leg syndrome - Primary    Relevant Medications    rOPINIRole (Requip) 3 mg tablet    gabapentin (Neurontin) 100 mg capsule     Other Visit Diagnoses         Class 3 severe obesity due to excess calories with serious comorbidity and body mass index (BMI) of 60.0 to 69.9 in adult              Rhinitis Sx well compensated on current therapy.  Will continue present therapy and follow.  Restless legs Sx overall well compensated on current therapy.  Will continue present therapy, and the addition of gabapentin has been helpful such that she does not need the gabapentin every night.  Will continue present therapy and follow.  Of note, she reports that her Sx have lessened significantly since having hysterectomy in 12/2024.  Annual mammogram ordered by Gyn.  Pt. advised on improving dietary choices and portion control as well as increasing exercise to promote weight loss.  [facility] and she states that she made multiple phone calls without getting her call answered and she got no return call after leaving messages.  We tried to help get her access at prior OV's without success.  It is noted that she had lost 28# on her own previously, but over the past 12 months, she had regained 30#.  She was given info for [facility] at a previous OV, but she states that her payer will not cover appropriate intervention for this issue.  Annual screening labs done in February were at goal or WNL.      Follow up in 6 months

## 2025-07-30 ENCOUNTER — APPOINTMENT (OUTPATIENT)
Dept: PRIMARY CARE | Facility: CLINIC | Age: 50
End: 2025-07-30
Payer: COMMERCIAL

## 2025-08-13 ENCOUNTER — OFFICE VISIT (OUTPATIENT)
Dept: PRIMARY CARE | Facility: CLINIC | Age: 50
End: 2025-08-13
Payer: COMMERCIAL

## 2025-08-13 VITALS
BODY MASS INDEX: 66.79 KG/M2 | TEMPERATURE: 96.5 F | OXYGEN SATURATION: 96 % | WEIGHT: 293 LBS | DIASTOLIC BLOOD PRESSURE: 84 MMHG | SYSTOLIC BLOOD PRESSURE: 130 MMHG | HEART RATE: 69 BPM

## 2025-08-13 DIAGNOSIS — H66.001 NON-RECURRENT ACUTE SUPPURATIVE OTITIS MEDIA OF RIGHT EAR WITHOUT SPONTANEOUS RUPTURE OF TYMPANIC MEMBRANE: Primary | ICD-10-CM

## 2025-08-13 PROCEDURE — 3079F DIAST BP 80-89 MM HG: CPT | Performed by: INTERNAL MEDICINE

## 2025-08-13 PROCEDURE — 99213 OFFICE O/P EST LOW 20 MIN: CPT | Performed by: INTERNAL MEDICINE

## 2025-08-13 PROCEDURE — 3075F SYST BP GE 130 - 139MM HG: CPT | Performed by: INTERNAL MEDICINE

## 2025-08-13 RX ORDER — LEVOFLOXACIN 500 MG/1
500 TABLET, FILM COATED ORAL DAILY
Qty: 10 TABLET | Refills: 0 | Status: SHIPPED | OUTPATIENT
Start: 2025-08-13 | End: 2025-08-23

## 2025-08-13 ASSESSMENT — ENCOUNTER SYMPTOMS
ENDOCRINE NEGATIVE: 1
CARDIOVASCULAR NEGATIVE: 1
CONSTITUTIONAL NEGATIVE: 1
RESPIRATORY NEGATIVE: 1
SINUS PAIN: 1
MUSCULOSKELETAL NEGATIVE: 1
EYES NEGATIVE: 1
GASTROINTESTINAL NEGATIVE: 1
NEUROLOGICAL NEGATIVE: 1
PSYCHIATRIC NEGATIVE: 1
HEMATOLOGIC/LYMPHATIC NEGATIVE: 1
ALLERGIC/IMMUNOLOGIC NEGATIVE: 1

## 2025-08-13 ASSESSMENT — PATIENT HEALTH QUESTIONNAIRE - PHQ9
1. LITTLE INTEREST OR PLEASURE IN DOING THINGS: NOT AT ALL
SUM OF ALL RESPONSES TO PHQ9 QUESTIONS 1 AND 2: 0
2. FEELING DOWN, DEPRESSED OR HOPELESS: NOT AT ALL

## 2025-08-25 ENCOUNTER — TELEPHONE (OUTPATIENT)
Dept: PRIMARY CARE | Facility: CLINIC | Age: 50
End: 2025-08-25
Payer: COMMERCIAL

## 2025-09-03 ENCOUNTER — APPOINTMENT (OUTPATIENT)
Dept: ORTHOPEDIC SURGERY | Facility: CLINIC | Age: 50
End: 2025-09-03
Payer: COMMERCIAL

## 2025-09-04 ENCOUNTER — APPOINTMENT (OUTPATIENT)
Dept: OTOLARYNGOLOGY | Facility: CLINIC | Age: 50
End: 2025-09-04
Payer: COMMERCIAL

## 2026-01-21 ENCOUNTER — APPOINTMENT (OUTPATIENT)
Dept: PRIMARY CARE | Facility: CLINIC | Age: 51
End: 2026-01-21
Payer: COMMERCIAL

## (undated) DEVICE — MANIFOLD, 4 PORT NEPTUNE STANDARD

## (undated) DEVICE — GOWN, SURGICAL, SMARTGOWN, XLARGE, STERILE

## (undated) DEVICE — TUBING SET, TRI-LUMEN, FILTERED, F/AIRSEAL

## (undated) DEVICE — OCCLUDER, COLPO-PNEUMO

## (undated) DEVICE — CATHETER, URETHRAL, FOLEY, 2 WAY, 16 FR, 5 CC, SILICONE

## (undated) DEVICE — COVER HANDLE LIGHT, STERIS, BLUE, STERILE

## (undated) DEVICE — DRESSING, NON-ADHERENT, TELFA, 3 X 8 IN, NS

## (undated) DEVICE — CATHETER, RED RUBBER 14FR

## (undated) DEVICE — SEAL, UNIVERSAL, 5-12MM

## (undated) DEVICE — TROCAR SYSTEM, BALLOON, KII GELPORT, 12 X 100MM

## (undated) DEVICE — POSITIONING, THE PINK PAD, PIGAZZI SYSTEM

## (undated) DEVICE — TOWELS 4-PK

## (undated) DEVICE — OBTURATOR, BLADELESS , SU

## (undated) DEVICE — DRAPE PACK, LAVH, W/ATTACHED LEGGINGS, W/POUCH, 100 X 114 IN, LF, STERILE

## (undated) DEVICE — DRAPE, ARM XI

## (undated) DEVICE — APPLICATOR, ENDOSCOPIC, F/SURGICEL POWDER

## (undated) DEVICE — SOLUTION, IRRIGATION, SODIUM CHLORIDE 0.9%, 1000 ML, POUR BOTTLE

## (undated) DEVICE — DRAPE, PAD, PREP, W/ 9 IN CUFF, 24 X 41, LF, NS

## (undated) DEVICE — TOWEL PACK, STERILE, 4/PACK, BLUE

## (undated) DEVICE — COVER, TIP HOT SHEARS ENDOWRIST

## (undated) DEVICE — SUTURE, MONOCRYL PLUS, 4-0, PS-2 UD 27IN

## (undated) DEVICE — Device

## (undated) DEVICE — SCISSORS, MONOPOLAR, CURVED, 8MM

## (undated) DEVICE — SUTURE, VICRYL, 0, 27 IN, UR-6, VIOLET

## (undated) DEVICE — DRESSING, ADHESIVE, ISLAND, TELFA, 2 X 3.75 IN, LF

## (undated) DEVICE — GLOVE, PROTEXIS PI CLASSIC, SZ-7.5, PF, LF

## (undated) DEVICE — SWABSTICK, PREP, IODOPHOR, PVP, 8 IN

## (undated) DEVICE — GOWN, ASTOUND, XL

## (undated) DEVICE — FORCEPS, BIPOLAR FENESTRATED XI

## (undated) DEVICE — PROTECTOR, NERVE, ULNAR, PINK

## (undated) DEVICE — ACCESS PORT, 8M M, LOW PROFILE W/BLADELESS OPTICAL TIP, 100MM LENGTH

## (undated) DEVICE — RETRACTOR, CERVICAL CUP, VCARE, STANDARD

## (undated) DEVICE — IRRIGATION SET, CYSTOSCOPY, F/CONSTANT/INTERMITTENT, 8 GTT/CC, 77 IN

## (undated) DEVICE — SOLUTION, IRRIGATION, USP, SODIUM CHLORIDE 0.9%, 3000 ML, BAG

## (undated) DEVICE — SEALER, VESSEL, EXTENDED

## (undated) DEVICE — DRIVER, NEEDLE, MEGA SUTURE CUT, DAVINCI XI

## (undated) DEVICE — PREP TRAY, SKIN, DRY, W/GLOVES

## (undated) DEVICE — HEMOSTAT, SURGICEL POWDER 3.0GRAMS

## (undated) DEVICE — CONTAINER STERILE SPECIMEN 90ML, STERILE

## (undated) DEVICE — IRRIGATION SET, CYSTOSCOPY, REGULATING CLAMP, STRAIGHT, 81 IN

## (undated) DEVICE — KIT, ROBOTIC, CUSTOM UHC

## (undated) DEVICE — SUTURE, V-LOC, 0, 12IN, GS-21, GR 180 ABS

## (undated) DEVICE — DRAPE, COLUMN, DAVINCI XI

## (undated) DEVICE — TUBING, SUCTION, NON-CONDUCTIVE,W/MALE CONNECT, 6MM X 12 FT, STERILE

## (undated) DEVICE — SYRINGE, 60 CC, LUER LOCK, MONOJECT

## (undated) DEVICE — STRIP, SKIN CLOSURE, STERI STRIP, REINFORCED, 0.5 X 4 IN

## (undated) DEVICE — BAG, DRAIN METER, URINE, 350CC, LF